# Patient Record
Sex: MALE | Race: BLACK OR AFRICAN AMERICAN | Employment: FULL TIME | URBAN - METROPOLITAN AREA
[De-identification: names, ages, dates, MRNs, and addresses within clinical notes are randomized per-mention and may not be internally consistent; named-entity substitution may affect disease eponyms.]

---

## 2019-01-29 ENCOUNTER — HOSPITAL ENCOUNTER (EMERGENCY)
Age: 24
Discharge: HOME | End: 2019-01-30
Attending: EMERGENCY MEDICINE
Payer: COMMERCIAL

## 2019-01-29 DIAGNOSIS — T78.40XA ALLERGIC REACTION, INITIAL ENCOUNTER: Primary | ICD-10-CM

## 2019-01-29 PROCEDURE — 2500000003 HC RX 250 WO HCPCS: Performed by: PHYSICIAN ASSISTANT

## 2019-01-29 PROCEDURE — 6360000002 HC RX W HCPCS: Performed by: PHYSICIAN ASSISTANT

## 2019-01-29 PROCEDURE — 96375 TX/PRO/DX INJ NEW DRUG ADDON: CPT

## 2019-01-29 PROCEDURE — 96374 THER/PROPH/DIAG INJ IV PUSH: CPT

## 2019-01-29 PROCEDURE — 99282 EMERGENCY DEPT VISIT SF MDM: CPT

## 2019-01-29 RX ORDER — METHYLPREDNISOLONE SODIUM SUCCINATE 125 MG/2ML
125 INJECTION, POWDER, LYOPHILIZED, FOR SOLUTION INTRAMUSCULAR; INTRAVENOUS ONCE
Status: COMPLETED | OUTPATIENT
Start: 2019-01-30 | End: 2019-01-29

## 2019-01-29 RX ORDER — DIPHENHYDRAMINE HYDROCHLORIDE 50 MG/ML
50 INJECTION INTRAMUSCULAR; INTRAVENOUS ONCE
Status: COMPLETED | OUTPATIENT
Start: 2019-01-30 | End: 2019-01-29

## 2019-01-29 RX ADMIN — METHYLPREDNISOLONE SODIUM SUCCINATE 125 MG: 125 INJECTION, POWDER, FOR SOLUTION INTRAMUSCULAR; INTRAVENOUS at 23:55

## 2019-01-29 RX ADMIN — DIPHENHYDRAMINE HYDROCHLORIDE 50 MG: 50 INJECTION, SOLUTION INTRAMUSCULAR; INTRAVENOUS at 23:55

## 2019-01-29 RX ADMIN — FAMOTIDINE 20 MG: 10 INJECTION, SOLUTION INTRAVENOUS at 23:55

## 2019-01-30 VITALS
BODY MASS INDEX: 28.93 KG/M2 | OXYGEN SATURATION: 97 % | SYSTOLIC BLOOD PRESSURE: 105 MMHG | WEIGHT: 180 LBS | TEMPERATURE: 97.3 F | HEART RATE: 74 BPM | RESPIRATION RATE: 16 BRPM | DIASTOLIC BLOOD PRESSURE: 70 MMHG | HEIGHT: 66 IN

## 2019-01-30 RX ORDER — FAMOTIDINE 20 MG/1
20 TABLET, FILM COATED ORAL 2 TIMES DAILY
Qty: 60 TABLET | Refills: 0 | Status: SHIPPED | OUTPATIENT
Start: 2019-01-30 | End: 2019-02-15

## 2019-01-30 RX ORDER — DIPHENHYDRAMINE HCL 25 MG
25 CAPSULE ORAL EVERY 4 HOURS PRN
Qty: 20 CAPSULE | Refills: 0 | Status: SHIPPED | OUTPATIENT
Start: 2019-01-30 | End: 2019-02-09

## 2019-01-30 RX ORDER — EPINEPHRINE 0.3 MG/.3ML
INJECTION SUBCUTANEOUS
Qty: 1 EACH | Refills: 0 | Status: SHIPPED | OUTPATIENT
Start: 2019-01-30

## 2019-01-30 RX ORDER — PREDNISONE 10 MG/1
60 TABLET ORAL DAILY
Qty: 30 TABLET | Refills: 0 | Status: SHIPPED | OUTPATIENT
Start: 2019-01-30 | End: 2019-02-04

## 2019-01-30 ASSESSMENT — ENCOUNTER SYMPTOMS
DIARRHEA: 0
VOMITING: 0
ABDOMINAL PAIN: 0
NAUSEA: 0
COUGH: 0
RHINORRHEA: 0
TROUBLE SWALLOWING: 0
SHORTNESS OF BREATH: 0

## 2019-02-15 ENCOUNTER — OFFICE VISIT (OUTPATIENT)
Dept: INTERNAL MEDICINE CLINIC | Age: 24
End: 2019-02-15
Payer: COMMERCIAL

## 2019-02-15 VITALS
WEIGHT: 153.4 LBS | DIASTOLIC BLOOD PRESSURE: 62 MMHG | HEIGHT: 67 IN | BODY MASS INDEX: 24.08 KG/M2 | HEART RATE: 75 BPM | SYSTOLIC BLOOD PRESSURE: 102 MMHG | OXYGEN SATURATION: 93 %

## 2019-02-15 DIAGNOSIS — G89.29 CHRONIC MIDLINE LOW BACK PAIN WITHOUT SCIATICA: ICD-10-CM

## 2019-02-15 DIAGNOSIS — B00.9 HERPES: ICD-10-CM

## 2019-02-15 DIAGNOSIS — S23.9XXA THORACIC BACK SPRAIN, INITIAL ENCOUNTER: Primary | ICD-10-CM

## 2019-02-15 DIAGNOSIS — Z00.00 HEALTHCARE MAINTENANCE: ICD-10-CM

## 2019-02-15 DIAGNOSIS — M54.50 CHRONIC MIDLINE LOW BACK PAIN WITHOUT SCIATICA: ICD-10-CM

## 2019-02-15 DIAGNOSIS — T78.40XA ALLERGIC REACTION, INITIAL ENCOUNTER: ICD-10-CM

## 2019-02-15 DIAGNOSIS — G43.909 MIGRAINE WITHOUT STATUS MIGRAINOSUS, NOT INTRACTABLE, UNSPECIFIED MIGRAINE TYPE: ICD-10-CM

## 2019-02-15 LAB
A/G RATIO: 1.6 (ref 1.1–2.2)
ALBUMIN SERPL-MCNC: 4.3 G/DL (ref 3.4–5)
ALP BLD-CCNC: 59 U/L (ref 40–129)
ALT SERPL-CCNC: 13 U/L (ref 10–40)
ANION GAP SERPL CALCULATED.3IONS-SCNC: 12 MMOL/L (ref 3–16)
AST SERPL-CCNC: 18 U/L (ref 15–37)
BASOPHILS ABSOLUTE: 0 K/UL (ref 0–0.2)
BASOPHILS RELATIVE PERCENT: 0.6 %
BILIRUB SERPL-MCNC: 0.4 MG/DL (ref 0–1)
BUN BLDV-MCNC: 8 MG/DL (ref 7–20)
CALCIUM SERPL-MCNC: 9.8 MG/DL (ref 8.3–10.6)
CHLORIDE BLD-SCNC: 106 MMOL/L (ref 99–110)
CO2: 24 MMOL/L (ref 21–32)
CREAT SERPL-MCNC: 0.8 MG/DL (ref 0.9–1.3)
EOSINOPHILS ABSOLUTE: 0.1 K/UL (ref 0–0.6)
EOSINOPHILS RELATIVE PERCENT: 1.6 %
GFR AFRICAN AMERICAN: >60
GFR NON-AFRICAN AMERICAN: >60
GLOBULIN: 2.7 G/DL
GLUCOSE BLD-MCNC: 96 MG/DL (ref 70–99)
HCT VFR BLD CALC: 45.8 % (ref 40.5–52.5)
HEMOGLOBIN: 14.8 G/DL (ref 13.5–17.5)
LYMPHOCYTES ABSOLUTE: 2 K/UL (ref 1–5.1)
LYMPHOCYTES RELATIVE PERCENT: 45.1 %
MCH RBC QN AUTO: 29.3 PG (ref 26–34)
MCHC RBC AUTO-ENTMCNC: 32.2 G/DL (ref 31–36)
MCV RBC AUTO: 90.8 FL (ref 80–100)
MONOCYTES ABSOLUTE: 0.4 K/UL (ref 0–1.3)
MONOCYTES RELATIVE PERCENT: 8 %
NEUTROPHILS ABSOLUTE: 2 K/UL (ref 1.7–7.7)
NEUTROPHILS RELATIVE PERCENT: 44.7 %
PDW BLD-RTO: 14.9 % (ref 12.4–15.4)
PLATELET # BLD: 296 K/UL (ref 135–450)
PMV BLD AUTO: 8.1 FL (ref 5–10.5)
POTASSIUM SERPL-SCNC: 4.6 MMOL/L (ref 3.5–5.1)
RBC # BLD: 5.04 M/UL (ref 4.2–5.9)
SODIUM BLD-SCNC: 142 MMOL/L (ref 136–145)
TOTAL PROTEIN: 7 G/DL (ref 6.4–8.2)
VITAMIN D 25-HYDROXY: 27.5 NG/ML
WBC # BLD: 4.4 K/UL (ref 4–11)

## 2019-02-15 PROCEDURE — 36415 COLL VENOUS BLD VENIPUNCTURE: CPT | Performed by: NURSE PRACTITIONER

## 2019-02-15 PROCEDURE — 1036F TOBACCO NON-USER: CPT | Performed by: NURSE PRACTITIONER

## 2019-02-15 PROCEDURE — G8420 CALC BMI NORM PARAMETERS: HCPCS | Performed by: NURSE PRACTITIONER

## 2019-02-15 PROCEDURE — G8484 FLU IMMUNIZE NO ADMIN: HCPCS | Performed by: NURSE PRACTITIONER

## 2019-02-15 PROCEDURE — G8427 DOCREV CUR MEDS BY ELIG CLIN: HCPCS | Performed by: NURSE PRACTITIONER

## 2019-02-15 PROCEDURE — 99203 OFFICE O/P NEW LOW 30 MIN: CPT | Performed by: NURSE PRACTITIONER

## 2019-02-15 RX ORDER — VALACYCLOVIR HYDROCHLORIDE 500 MG/1
500 TABLET, FILM COATED ORAL 2 TIMES DAILY
COMMUNITY
End: 2019-02-15 | Stop reason: SDUPTHER

## 2019-02-15 RX ORDER — VALACYCLOVIR HYDROCHLORIDE 500 MG/1
500 TABLET, FILM COATED ORAL 2 TIMES DAILY
Qty: 60 TABLET | Refills: 3 | Status: SHIPPED | OUTPATIENT
Start: 2019-02-15 | End: 2020-07-23

## 2019-02-15 ASSESSMENT — PATIENT HEALTH QUESTIONNAIRE - PHQ9
SUM OF ALL RESPONSES TO PHQ QUESTIONS 1-9: 0
2. FEELING DOWN, DEPRESSED OR HOPELESS: 0
SUM OF ALL RESPONSES TO PHQ QUESTIONS 1-9: 0
SUM OF ALL RESPONSES TO PHQ9 QUESTIONS 1 & 2: 0
1. LITTLE INTEREST OR PLEASURE IN DOING THINGS: 0

## 2019-02-15 ASSESSMENT — ENCOUNTER SYMPTOMS
COUGH: 0
CHEST TIGHTNESS: 0
STRIDOR: 0
VISUAL CHANGE: 0
ABDOMINAL DISTENTION: 0
BOWEL INCONTINENCE: 0
BACK PAIN: 1

## 2019-02-16 LAB
HIV AG/AB: NORMAL
HIV ANTIGEN: NORMAL
HIV-1 ANTIBODY: NORMAL
HIV-2 AB: NORMAL

## 2019-02-19 LAB
2000687N OAK TREE IGE: <0.1 KU/L
ALLERGEN ASPERGILLUS ALTERNATA IGE: <0.1 KU/L
ALLERGEN ASPERGILLUS FUMIGATUS IGE: <0.1 KU/L
ALLERGEN BERMUDA GRASS IGE: 11.9 KU/L
ALLERGEN BIRCH IGE: 0.27 KU/L
ALLERGEN CAT DANDER IGE: 1.16 KU/L
ALLERGEN COMMON SHORT RAGWEED IGE: 17.3 KU/L
ALLERGEN COTTONWOOD: 0.15 KU/L
ALLERGEN COW MILK IGE: 0.79 KU/L
ALLERGEN DOG DANDER IGE: 0.46 KU/L
ALLERGEN ELM IGE: 0.32 KU/L
ALLERGEN FUNGI/MOLD M.RACEMOSUS IGE: <0.1 KU/L
ALLERGEN GERMAN COCKROACH IGE: 2.11 KU/L
ALLERGEN HORMODENDRUM HORDEI IGE: <0.1 KU/L
ALLERGEN MAPLE/BOX ELDER IGE: 5.52 KU/L
ALLERGEN MITE DUST FARINAE IGE: 2.46 KU/L
ALLERGEN MITE DUST PTERONYSSINUS IGE: 2.43 KU/L
ALLERGEN MOUNTAIN CEDAR: 0.3 KU/L
ALLERGEN MOUSE EPITHELIA IGE: <0.1 KU/L
ALLERGEN PEANUT (F13) IGE: <0.1 KU/L
ALLERGEN PECAN TREE IGE: 4.78 KU/L
ALLERGEN PENICILLIUM NOTATUM: <0.1 KU/L
ALLERGEN ROUGH PIGWEED (W14) IGE: <0.1 KU/L
ALLERGEN RUSSIAN THISTLE IGE: 0.2 KU/L
ALLERGEN SEE NOTE: NORMAL
ALLERGEN SHEEP SORREL (W18) IGE: 0.2 KU/L
ALLERGEN TIMOTHY GRASS: 6.61 KU/L
ALLERGEN TREE SYCAMORE: <0.1 KU/L
ALLERGEN WALNUT TREE IGE: 0.69 KU/L
ALLERGEN WHITE MULBERRY TREE, IGE: <0.1 KU/L
ALLERGEN, TREE, WHITE ASH IGE: 3.14 KU/L
IGE: 317 KU/L

## 2019-03-10 ENCOUNTER — HOSPITAL ENCOUNTER (EMERGENCY)
Age: 24
Discharge: HOME | End: 2019-03-10

## 2019-03-10 VITALS
BODY MASS INDEX: 24.29 KG/M2 | RESPIRATION RATE: 16 BRPM | OXYGEN SATURATION: 98 % | TEMPERATURE: 97.7 F | HEART RATE: 75 BPM | DIASTOLIC BLOOD PRESSURE: 69 MMHG | SYSTOLIC BLOOD PRESSURE: 114 MMHG | WEIGHT: 156 LBS

## 2019-03-10 DIAGNOSIS — R30.0 DYSURIA: Primary | ICD-10-CM

## 2019-03-10 DIAGNOSIS — Z20.2 POSSIBLE EXPOSURE TO STD: ICD-10-CM

## 2019-03-10 LAB
BILIRUBIN URINE: NEGATIVE
BLOOD, URINE: NEGATIVE
CLARITY: ABNORMAL
COLOR: YELLOW
EPITHELIAL CELLS, UA: 0 /HPF (ref 0–5)
GLUCOSE URINE: NEGATIVE MG/DL
HYALINE CASTS: 1 /LPF (ref 0–8)
KETONES, URINE: ABNORMAL MG/DL
LEUKOCYTE ESTERASE, URINE: ABNORMAL
MICROSCOPIC EXAMINATION: YES
NITRITE, URINE: NEGATIVE
PH UA: 7.5 (ref 5–8)
PROTEIN UA: ABNORMAL MG/DL
RBC UA: 3 /HPF (ref 0–4)
SPECIFIC GRAVITY UA: 1.03 (ref 1–1.03)
URINE REFLEX TO CULTURE: YES
URINE TYPE: ABNORMAL
UROBILINOGEN, URINE: 1 E.U./DL
WBC UA: 207 /HPF (ref 0–5)

## 2019-03-10 PROCEDURE — 81001 URINALYSIS AUTO W/SCOPE: CPT

## 2019-03-10 PROCEDURE — 6370000000 HC RX 637 (ALT 250 FOR IP): Performed by: PHYSICIAN ASSISTANT

## 2019-03-10 PROCEDURE — 87491 CHLMYD TRACH DNA AMP PROBE: CPT

## 2019-03-10 PROCEDURE — 87086 URINE CULTURE/COLONY COUNT: CPT

## 2019-03-10 PROCEDURE — 96372 THER/PROPH/DIAG INJ SC/IM: CPT

## 2019-03-10 PROCEDURE — 99283 EMERGENCY DEPT VISIT LOW MDM: CPT

## 2019-03-10 PROCEDURE — 6360000002 HC RX W HCPCS: Performed by: PHYSICIAN ASSISTANT

## 2019-03-10 PROCEDURE — 87591 N.GONORRHOEAE DNA AMP PROB: CPT

## 2019-03-10 RX ORDER — NAPROXEN 500 MG/1
500 TABLET ORAL 2 TIMES DAILY WITH MEALS
COMMUNITY
End: 2020-04-10

## 2019-03-10 RX ORDER — AZITHROMYCIN 250 MG/1
1000 TABLET, FILM COATED ORAL ONCE
Status: COMPLETED | OUTPATIENT
Start: 2019-03-10 | End: 2019-03-10

## 2019-03-10 RX ORDER — CEFTRIAXONE SODIUM 250 MG/1
250 INJECTION, POWDER, FOR SOLUTION INTRAMUSCULAR; INTRAVENOUS ONCE
Status: COMPLETED | OUTPATIENT
Start: 2019-03-10 | End: 2019-03-10

## 2019-03-10 RX ADMIN — AZITHROMYCIN 1000 MG: 250 TABLET, FILM COATED ORAL at 12:50

## 2019-03-10 RX ADMIN — CEFTRIAXONE SODIUM 250 MG: 250 INJECTION, POWDER, FOR SOLUTION INTRAMUSCULAR; INTRAVENOUS at 12:50

## 2019-03-10 ASSESSMENT — ENCOUNTER SYMPTOMS
SHORTNESS OF BREATH: 0
ABDOMINAL PAIN: 0
CONSTIPATION: 0
VOMITING: 0
NAUSEA: 0
DIARRHEA: 0
RESPIRATORY NEGATIVE: 1
COLOR CHANGE: 0
COUGH: 0
BACK PAIN: 0

## 2019-03-11 LAB
C. TRACHOMATIS DNA ,URINE: POSITIVE
N. GONORRHOEAE DNA, URINE: POSITIVE

## 2019-03-12 LAB — URINE CULTURE, ROUTINE: NORMAL

## 2020-04-10 ENCOUNTER — APPOINTMENT (OUTPATIENT)
Dept: GENERAL RADIOLOGY | Age: 25
End: 2020-04-10
Payer: COMMERCIAL

## 2020-04-10 ENCOUNTER — HOSPITAL ENCOUNTER (EMERGENCY)
Age: 25
Discharge: HOME | End: 2020-04-10
Attending: EMERGENCY MEDICINE
Payer: COMMERCIAL

## 2020-04-10 VITALS
OXYGEN SATURATION: 99 % | HEART RATE: 102 BPM | WEIGHT: 200 LBS | RESPIRATION RATE: 15 BRPM | DIASTOLIC BLOOD PRESSURE: 61 MMHG | HEIGHT: 67 IN | BODY MASS INDEX: 31.39 KG/M2 | TEMPERATURE: 99.4 F | SYSTOLIC BLOOD PRESSURE: 115 MMHG

## 2020-04-10 LAB
A/G RATIO: 1.1 (ref 1.1–2.2)
ALBUMIN SERPL-MCNC: 4.3 G/DL (ref 3.4–5)
ALP BLD-CCNC: 44 U/L (ref 40–129)
ALT SERPL-CCNC: 17 U/L (ref 10–40)
ANION GAP SERPL CALCULATED.3IONS-SCNC: 10 MMOL/L (ref 3–16)
AST SERPL-CCNC: 20 U/L (ref 15–37)
BASOPHILS ABSOLUTE: 0.1 K/UL (ref 0–0.2)
BASOPHILS RELATIVE PERCENT: 0.3 %
BILIRUB SERPL-MCNC: 0.5 MG/DL (ref 0–1)
BILIRUBIN URINE: NEGATIVE
BLOOD, URINE: NEGATIVE
BUN BLDV-MCNC: 8 MG/DL (ref 7–20)
CALCIUM SERPL-MCNC: 9.3 MG/DL (ref 8.3–10.6)
CHLORIDE BLD-SCNC: 99 MMOL/L (ref 99–110)
CLARITY: CLEAR
CO2: 24 MMOL/L (ref 21–32)
COLOR: YELLOW
CREAT SERPL-MCNC: 1.2 MG/DL (ref 0.9–1.3)
EOSINOPHILS ABSOLUTE: 0 K/UL (ref 0–0.6)
EOSINOPHILS RELATIVE PERCENT: 0 %
GFR AFRICAN AMERICAN: >60
GFR NON-AFRICAN AMERICAN: >60
GLOBULIN: 3.9 G/DL
GLUCOSE BLD-MCNC: 118 MG/DL (ref 70–99)
GLUCOSE URINE: NEGATIVE MG/DL
HCT VFR BLD CALC: 46.9 % (ref 40.5–52.5)
HEMOGLOBIN: 15.8 G/DL (ref 13.5–17.5)
KETONES, URINE: NEGATIVE MG/DL
LEUKOCYTE ESTERASE, URINE: NEGATIVE
LYMPHOCYTES ABSOLUTE: 1.2 K/UL (ref 1–5.1)
LYMPHOCYTES RELATIVE PERCENT: 6 %
MCH RBC QN AUTO: 29.3 PG (ref 26–34)
MCHC RBC AUTO-ENTMCNC: 33.7 G/DL (ref 31–36)
MCV RBC AUTO: 87 FL (ref 80–100)
MICROSCOPIC EXAMINATION: NORMAL
MONOCYTES ABSOLUTE: 1.9 K/UL (ref 0–1.3)
MONOCYTES RELATIVE PERCENT: 9.3 %
NEUTROPHILS ABSOLUTE: 16.8 K/UL (ref 1.7–7.7)
NEUTROPHILS RELATIVE PERCENT: 84.4 %
NITRITE, URINE: NEGATIVE
PDW BLD-RTO: 14 % (ref 12.4–15.4)
PH UA: 7 (ref 5–8)
PLATELET # BLD: 263 K/UL (ref 135–450)
PMV BLD AUTO: 7.5 FL (ref 5–10.5)
POTASSIUM SERPL-SCNC: 4.1 MMOL/L (ref 3.5–5.1)
PROTEIN UA: NEGATIVE MG/DL
RAPID INFLUENZA  B AGN: NEGATIVE
RAPID INFLUENZA A AGN: NEGATIVE
RBC # BLD: 5.39 M/UL (ref 4.2–5.9)
SODIUM BLD-SCNC: 133 MMOL/L (ref 136–145)
SPECIFIC GRAVITY UA: 1.01 (ref 1–1.03)
TOTAL CK: 698 U/L (ref 39–308)
TOTAL PROTEIN: 8.2 G/DL (ref 6.4–8.2)
URINE REFLEX TO CULTURE: NORMAL
URINE TYPE: NORMAL
UROBILINOGEN, URINE: 1 E.U./DL
WBC # BLD: 20 K/UL (ref 4–11)

## 2020-04-10 PROCEDURE — 6360000002 HC RX W HCPCS: Performed by: EMERGENCY MEDICINE

## 2020-04-10 PROCEDURE — 87804 INFLUENZA ASSAY W/OPTIC: CPT

## 2020-04-10 PROCEDURE — 82550 ASSAY OF CK (CPK): CPT

## 2020-04-10 PROCEDURE — 93005 ELECTROCARDIOGRAM TRACING: CPT | Performed by: EMERGENCY MEDICINE

## 2020-04-10 PROCEDURE — 80053 COMPREHEN METABOLIC PANEL: CPT

## 2020-04-10 PROCEDURE — 99285 EMERGENCY DEPT VISIT HI MDM: CPT

## 2020-04-10 PROCEDURE — 81003 URINALYSIS AUTO W/O SCOPE: CPT

## 2020-04-10 PROCEDURE — 85025 COMPLETE CBC W/AUTO DIFF WBC: CPT

## 2020-04-10 PROCEDURE — 6370000000 HC RX 637 (ALT 250 FOR IP): Performed by: PHYSICIAN ASSISTANT

## 2020-04-10 PROCEDURE — 6370000000 HC RX 637 (ALT 250 FOR IP): Performed by: EMERGENCY MEDICINE

## 2020-04-10 PROCEDURE — 71045 X-RAY EXAM CHEST 1 VIEW: CPT

## 2020-04-10 PROCEDURE — 96374 THER/PROPH/DIAG INJ IV PUSH: CPT

## 2020-04-10 PROCEDURE — 2580000003 HC RX 258: Performed by: PHYSICIAN ASSISTANT

## 2020-04-10 RX ORDER — ACETAMINOPHEN 500 MG
1000 TABLET ORAL EVERY 6 HOURS PRN
Qty: 30 TABLET | Refills: 0 | Status: SHIPPED | OUTPATIENT
Start: 2020-04-10 | End: 2020-07-23

## 2020-04-10 RX ORDER — METHYLPREDNISOLONE 4 MG/1
TABLET ORAL
Qty: 1 KIT | Refills: 0 | Status: SHIPPED | OUTPATIENT
Start: 2020-04-10 | End: 2020-04-16

## 2020-04-10 RX ORDER — DEXAMETHASONE SODIUM PHOSPHATE 10 MG/ML
10 INJECTION, SOLUTION INTRAMUSCULAR; INTRAVENOUS ONCE
Status: COMPLETED | OUTPATIENT
Start: 2020-04-10 | End: 2020-04-10

## 2020-04-10 RX ORDER — ACETAMINOPHEN 500 MG
1000 TABLET ORAL ONCE
Status: COMPLETED | OUTPATIENT
Start: 2020-04-10 | End: 2020-04-10

## 2020-04-10 RX ORDER — AMOXICILLIN 875 MG/1
875 TABLET, COATED ORAL 2 TIMES DAILY
Qty: 20 TABLET | Refills: 0 | Status: SHIPPED | OUTPATIENT
Start: 2020-04-10 | End: 2020-04-20

## 2020-04-10 RX ORDER — 0.9 % SODIUM CHLORIDE 0.9 %
1000 INTRAVENOUS SOLUTION INTRAVENOUS ONCE
Status: COMPLETED | OUTPATIENT
Start: 2020-04-10 | End: 2020-04-10

## 2020-04-10 RX ORDER — AMOXICILLIN 250 MG/1
1000 CAPSULE ORAL EVERY 8 HOURS SCHEDULED
Status: DISCONTINUED | OUTPATIENT
Start: 2020-04-10 | End: 2020-04-10 | Stop reason: HOSPADM

## 2020-04-10 RX ADMIN — ACETAMINOPHEN 1000 MG: 500 TABLET, FILM COATED ORAL at 12:05

## 2020-04-10 RX ADMIN — SODIUM CHLORIDE 1000 ML: 9 INJECTION, SOLUTION INTRAVENOUS at 13:02

## 2020-04-10 RX ADMIN — SODIUM CHLORIDE 1000 ML: 9 INJECTION, SOLUTION INTRAVENOUS at 11:47

## 2020-04-10 RX ADMIN — AMOXICILLIN 1000 MG: 250 CAPSULE ORAL at 14:30

## 2020-04-10 RX ADMIN — DEXAMETHASONE SODIUM PHOSPHATE 10 MG: 10 INJECTION, SOLUTION INTRAMUSCULAR; INTRAVENOUS at 14:30

## 2020-04-10 ASSESSMENT — PAIN DESCRIPTION - PAIN TYPE: TYPE: ACUTE PAIN

## 2020-04-10 ASSESSMENT — PAIN DESCRIPTION - LOCATION: LOCATION: BACK

## 2020-04-10 ASSESSMENT — PAIN SCALES - GENERAL: PAINLEVEL_OUTOF10: 10

## 2020-04-10 NOTE — ED NOTES
Pt discharged in stable condition, VSS, no signs of distress. Discharge instructions and meds reviewed. Pt verbalizes understanding and states no further questions or concerns unaddressed.        Abiola Harden RN  04/10/20 0021

## 2020-04-10 NOTE — ED PROVIDER NOTES
[facility]        Pt Name: Brian Teague  MRN: [id number]  [place] 1995  Date of evaluation: 4/10/2020  Provider: Alberto Recinos PA-C  PCP: MIKE Oconnor CNP     I have seen and evaluated this patient with my supervising physician Dominga Diggs       Chief Complaint   Patient presents with    Back Pain     Lower back down since yesterday at work. Denies injury. Denies SOB, cough, N/V/D, +sore throat, +weakness, +generalized body aches. Fever 102.3F now       HISTORY OF PRESENT ILLNESS   (Location, Timing/Onset, Context/Setting, Quality, Duration, Modifying Factors, Severity, Associated Signs and Symptoms)  Note limiting factors. Brian Teague is a 30 y.o. male that presents to the emergency department with a chief complaint shortness of breath, general fatigue, body aches and fever that began yesterday. He works at [employer]. Denies any known sick contact or recent travel. Denies any history of asthma, lung disease, vaping or smoking. Denies cough, shortness of breath, dysuria, hematuria, diarrhea, bloody stool, history of diabetes, kidney disease, recent injection or surgery IV drug use. Despite denying a cough he states the only thing that he is taken today is over-the-counter cough medicine. Rates the symptoms a 10 out of 10. Denies any other symptoms. Nursing Notes were all reviewed and agreed with or any disagreements were addressed in the HPI. REVIEW OF SYSTEMS    (2-9 systems for level 4, 10 or more for level 5)     Review of Systems    Positives and Pertinent negatives as per HPI. Except as noted above in the ROS, all other systems were reviewed and negative. PAST MEDICAL HISTORY     Past Medical History:   Diagnosis Date    Herpes 02/2015    Low back pain 6/4/2015    Migraines     Neck pain          SURGICAL HISTORY   History reviewed.  No pertinent surgical history. [id number]       Discharge Medication List as of 4/10/2020  2:33 PM      CONTINUE these medications which have NOT CHANGED    Details   valACYclovir (VALTREX) 500 MG tablet Take 1 tablet by mouth 2 times daily, Disp-60 tablet, R-3Normal      EPINEPHrine (EPIPEN 2-MANNIE) 0.3 MG/0.3ML SOAJ injection Use as directed for allergic reaction, Disp-1 each, R-0Print      metoclopramide (REGLAN) 10 MG tablet Take 1 tablet by mouth 3 times daily as needed (headache), Disp-20 tablet, R-0Print      diphenhydrAMINE (BENADRYL) 25 MG capsule Take 1 capsule by mouth every 8 hours as needed for Itching, Disp-20 capsule, R-0Print      methocarbamol (ROBAXIN) 500 MG tablet Take 2 tablets by mouth nightly as needed (pain), Disp-30 tablet, R-0      traMADol (ULTRAM) 50 MG tablet Take 50 mg by mouth every 6 hours as needed for Pain      ibuprofen (ADVIL;MOTRIN) 400 MG tablet Take 1 tablet by mouth every 8 hours as needed for Pain or Fever, Disp-60 tablet, R-1               ALLERGIES     Lactose    FAMILYHISTORY       Family History   Problem Relation Age of Onset    No Known Problems Mother     No Known Problems Father           SOCIAL HISTORY       Social History     Tobacco Use    Smoking status: Never Smoker    Smokeless tobacco: Never Used   Substance Use Topics    Alcohol use: No    Drug use: No       SCREENINGS             PHYSICAL EXAM    (up to 7 for level 4, 8 or more for level 5)     ED Triage Vitals [04/10/20 1118]   BP Temp Temp Source Pulse Resp SpO2 Height Weight   121/77 102.3 °F (39.1 °C) Oral 141 18 95 % 5' 7\" (1.702 m) 200 lb (90.7 kg)       Physical Exam  Vitals signs and nursing note reviewed. Constitutional:       Appearance: He is well-developed. He is not diaphoretic. HENT:      Head: Atraumatic. Nose: Nose normal.      Mouth/Throat:      Mouth: Mucous membranes are moist.      Pharynx: No oropharyngeal exudate or posterior oropharyngeal erythema.    Eyes:      General: instructions on prevention of transmission of infection to others. The patient is low risk for mortality based on demographic, history and clinical factors. Specific COVID19 testing is not available or not approved in this patient. Given the best available information and clinical assessment, I estimate the risk of hospitalization to be greater than the risk of treatment at home. I have explained to the patient that the risk could rapidly change, given precautions for return and instructions on how to minimize being contagious. FINAL IMPRESSION      1. Acute tonsillitis, unspecified etiology    2.  Acute bilateral low back pain without sciatica          DISPOSITION/PLAN   DISPOSITION Decision To Discharge 04/10/2020 02:07:00 PM      PATIENT REFERREDTO:  Renetta Park, APRN - CNP  [address] [address]  [phone]    Schedule an appointment as soon as possible for a visit       [facility] Emergency Department  [address] [place] [address]  [phone]    If symptoms worsen      DISCHARGE MEDICATIONS:  Discharge Medication List as of 4/10/2020  2:33 PM      START taking these medications    Details   amoxicillin (AMOXIL) 875 MG tablet Take 1 tablet by mouth 2 times daily for 10 days, Disp-20 tablet, R-0Print      methylPREDNISolone (MEDROL, MANNIE,) 4 MG tablet Take by mouth., Disp-1 kit, R-0Print      acetaminophen (TYLENOL) 500 MG tablet Take 2 tablets by mouth every 6 hours as needed for Pain, Disp-30 tablet, R-0Print             DISCONTINUED MEDICATIONS:  Discharge Medication List as of 4/10/2020  2:33 PM      STOP taking these medications       naproxen (NAPROSYN) 500 MG tablet Comments:   Reason for Stopping:                      (Please note that portions of this note were completed with a voice recognition program.  Efforts were made to edit the dictations but occasionally words are mis-transcribed.)    Yolanda Beth PA-C (electronically signed)            All Paniagua PA-C  04/10/20 1715

## 2020-04-11 LAB
EKG ATRIAL RATE: 125 BPM
EKG DIAGNOSIS: NORMAL
EKG P AXIS: 46 DEGREES
EKG P-R INTERVAL: 142 MS
EKG Q-T INTERVAL: 286 MS
EKG QRS DURATION: 88 MS
EKG QTC CALCULATION (BAZETT): 412 MS
EKG R AXIS: 50 DEGREES
EKG T AXIS: 34 DEGREES
EKG VENTRICULAR RATE: 125 BPM

## 2020-04-11 PROCEDURE — 93010 ELECTROCARDIOGRAM REPORT: CPT | Performed by: INTERNAL MEDICINE

## 2020-04-13 ENCOUNTER — HOSPITAL ENCOUNTER (EMERGENCY)
Age: 25
Discharge: HOME | End: 2020-04-13
Attending: EMERGENCY MEDICINE
Payer: COMMERCIAL

## 2020-04-13 ENCOUNTER — APPOINTMENT (OUTPATIENT)
Dept: GENERAL RADIOLOGY | Age: 25
End: 2020-04-13
Payer: COMMERCIAL

## 2020-04-13 VITALS
RESPIRATION RATE: 10 BRPM | WEIGHT: 200 LBS | SYSTOLIC BLOOD PRESSURE: 100 MMHG | BODY MASS INDEX: 31.39 KG/M2 | DIASTOLIC BLOOD PRESSURE: 66 MMHG | OXYGEN SATURATION: 98 % | HEIGHT: 67 IN | HEART RATE: 78 BPM | TEMPERATURE: 98.5 F

## 2020-04-13 LAB
A/G RATIO: 1 (ref 1.1–2.2)
ALBUMIN SERPL-MCNC: 3.5 G/DL (ref 3.4–5)
ALP BLD-CCNC: 38 U/L (ref 40–129)
ALT SERPL-CCNC: 17 U/L (ref 10–40)
ANION GAP SERPL CALCULATED.3IONS-SCNC: 11 MMOL/L (ref 3–16)
AST SERPL-CCNC: 23 U/L (ref 15–37)
BASOPHILS ABSOLUTE: 0.1 K/UL (ref 0–0.2)
BASOPHILS RELATIVE PERCENT: 1 %
BILIRUB SERPL-MCNC: 0.3 MG/DL (ref 0–1)
BUN BLDV-MCNC: 12 MG/DL (ref 7–20)
CALCIUM SERPL-MCNC: 8.6 MG/DL (ref 8.3–10.6)
CHLORIDE BLD-SCNC: 101 MMOL/L (ref 99–110)
CO2: 24 MMOL/L (ref 21–32)
CREAT SERPL-MCNC: 1.1 MG/DL (ref 0.9–1.3)
D DIMER: <200 NG/ML DDU (ref 0–229)
EKG ATRIAL RATE: 89 BPM
EKG DIAGNOSIS: NORMAL
EKG P AXIS: 51 DEGREES
EKG P-R INTERVAL: 150 MS
EKG Q-T INTERVAL: 356 MS
EKG QRS DURATION: 86 MS
EKG QTC CALCULATION (BAZETT): 433 MS
EKG R AXIS: 43 DEGREES
EKG T AXIS: 36 DEGREES
EKG VENTRICULAR RATE: 89 BPM
EOSINOPHILS ABSOLUTE: 0.2 K/UL (ref 0–0.6)
EOSINOPHILS RELATIVE PERCENT: 2.7 %
GFR AFRICAN AMERICAN: >60
GFR NON-AFRICAN AMERICAN: >60
GLOBULIN: 3.6 G/DL
GLUCOSE BLD-MCNC: 108 MG/DL (ref 70–99)
HCT VFR BLD CALC: 41.9 % (ref 40.5–52.5)
HEMOGLOBIN: 14.2 G/DL (ref 13.5–17.5)
LYMPHOCYTES ABSOLUTE: 2.2 K/UL (ref 1–5.1)
LYMPHOCYTES RELATIVE PERCENT: 32.6 %
MCH RBC QN AUTO: 29.5 PG (ref 26–34)
MCHC RBC AUTO-ENTMCNC: 33.9 G/DL (ref 31–36)
MCV RBC AUTO: 87.2 FL (ref 80–100)
MONOCYTES ABSOLUTE: 1 K/UL (ref 0–1.3)
MONOCYTES RELATIVE PERCENT: 15.3 %
NEUTROPHILS ABSOLUTE: 3.3 K/UL (ref 1.7–7.7)
NEUTROPHILS RELATIVE PERCENT: 48.4 %
PDW BLD-RTO: 14.3 % (ref 12.4–15.4)
PLATELET # BLD: 317 K/UL (ref 135–450)
PMV BLD AUTO: 7.3 FL (ref 5–10.5)
POTASSIUM REFLEX MAGNESIUM: 4 MMOL/L (ref 3.5–5.1)
RBC # BLD: 4.81 M/UL (ref 4.2–5.9)
SODIUM BLD-SCNC: 136 MMOL/L (ref 136–145)
TOTAL PROTEIN: 7.1 G/DL (ref 6.4–8.2)
TROPONIN: <0.01 NG/ML
WBC # BLD: 6.7 K/UL (ref 4–11)

## 2020-04-13 PROCEDURE — 85379 FIBRIN DEGRADATION QUANT: CPT

## 2020-04-13 PROCEDURE — 71045 X-RAY EXAM CHEST 1 VIEW: CPT

## 2020-04-13 PROCEDURE — 93010 ELECTROCARDIOGRAM REPORT: CPT | Performed by: INTERNAL MEDICINE

## 2020-04-13 PROCEDURE — 80053 COMPREHEN METABOLIC PANEL: CPT

## 2020-04-13 PROCEDURE — 6360000002 HC RX W HCPCS: Performed by: PHYSICIAN ASSISTANT

## 2020-04-13 PROCEDURE — 93005 ELECTROCARDIOGRAM TRACING: CPT | Performed by: EMERGENCY MEDICINE

## 2020-04-13 PROCEDURE — 85025 COMPLETE CBC W/AUTO DIFF WBC: CPT

## 2020-04-13 PROCEDURE — 96375 TX/PRO/DX INJ NEW DRUG ADDON: CPT

## 2020-04-13 PROCEDURE — 96374 THER/PROPH/DIAG INJ IV PUSH: CPT

## 2020-04-13 PROCEDURE — 84484 ASSAY OF TROPONIN QUANT: CPT

## 2020-04-13 PROCEDURE — 36415 COLL VENOUS BLD VENIPUNCTURE: CPT

## 2020-04-13 PROCEDURE — 99285 EMERGENCY DEPT VISIT HI MDM: CPT

## 2020-04-13 PROCEDURE — C9113 INJ PANTOPRAZOLE SODIUM, VIA: HCPCS | Performed by: PHYSICIAN ASSISTANT

## 2020-04-13 RX ORDER — RANITIDINE 150 MG/1
150 TABLET ORAL 2 TIMES DAILY
Qty: 60 TABLET | Refills: 0 | Status: SHIPPED | OUTPATIENT
Start: 2020-04-13 | End: 2020-06-24 | Stop reason: SINTOL

## 2020-04-13 RX ORDER — PANTOPRAZOLE SODIUM 40 MG/10ML
40 INJECTION, POWDER, LYOPHILIZED, FOR SOLUTION INTRAVENOUS ONCE
Status: COMPLETED | OUTPATIENT
Start: 2020-04-13 | End: 2020-04-13

## 2020-04-13 RX ORDER — ONDANSETRON 2 MG/ML
4 INJECTION INTRAMUSCULAR; INTRAVENOUS ONCE
Status: COMPLETED | OUTPATIENT
Start: 2020-04-13 | End: 2020-04-13

## 2020-04-13 RX ADMIN — ONDANSETRON 4 MG: 2 INJECTION INTRAMUSCULAR; INTRAVENOUS at 03:25

## 2020-04-13 RX ADMIN — PANTOPRAZOLE SODIUM 40 MG: 40 INJECTION, POWDER, FOR SOLUTION INTRAVENOUS at 03:25

## 2020-04-13 ASSESSMENT — ENCOUNTER SYMPTOMS
CHEST TIGHTNESS: 0
SORE THROAT: 1
ABDOMINAL PAIN: 0
DIARRHEA: 0
VOMITING: 0
COUGH: 0
NAUSEA: 0
SHORTNESS OF BREATH: 1

## 2020-04-13 ASSESSMENT — PAIN SCALES - GENERAL: PAINLEVEL_OUTOF10: 9

## 2020-04-13 NOTE — ED PROVIDER NOTES
Comments: Resting comfortably in the examination room. No evidence of hiccups during history taking under physical exam.   HENT:      Head: Normocephalic and atraumatic. Right Ear: Hearing and external ear normal.      Left Ear: Hearing and external ear normal.   Eyes:      General: No scleral icterus. Right eye: No discharge. Left eye: No discharge. Conjunctiva/sclera: Conjunctivae normal.   Neck:      Musculoskeletal: Normal range of motion. Vascular: No JVD. Cardiovascular:      Rate and Rhythm: Normal rate and regular rhythm. Heart sounds: No murmur. No friction rub. No gallop. Pulmonary:      Effort: Pulmonary effort is normal. No accessory muscle usage or respiratory distress. Breath sounds: Normal breath sounds. No wheezing, rhonchi or rales. Abdominal:      General: Abdomen is flat. There is no distension. Palpations: Abdomen is soft. Abdomen is not rigid. There is no mass. Tenderness: There is no abdominal tenderness. There is no guarding or rebound. Skin:     General: Skin is warm and dry. Neurological:      Mental Status: He is alert and oriented to person, place, and time. GCS: GCS eye subscore is 4. GCS verbal subscore is 5. GCS motor subscore is 6. Cranial Nerves: No cranial nerve deficit. Sensory: No sensory deficit. Coordination: Coordination normal.   Psychiatric:         Behavior: Behavior normal. Behavior is cooperative.          DIAGNOSTIC RESULTS   LABS:    Labs Reviewed   COMPREHENSIVE METABOLIC PANEL W/ REFLEX TO MG FOR LOW K - Abnormal; Notable for the following components:       Result Value    Glucose 108 (*)     Albumin/Globulin Ratio 1.0 (*)     Alkaline Phosphatase 38 (*)     All other components within normal limits    Narrative:     Performed at:  [facility] [address], [facility], [address]   Phone [phone]   CBC WITH AUTO DIFFERENTIAL    Narrative: silhouette is normal.     No acute abnormality. PROCEDURES   Unless otherwise noted below, none     Procedures    CRITICAL CARE TIME   N/A    CONSULTS:  None      EMERGENCY DEPARTMENT COURSE and DIFFERENTIAL DIAGNOSIS/MDM:   Vitals:    Vitals:    04/13/20 0120   BP: 106/70   Pulse: 91   Resp: 18   Temp: 98.5 °F (36.9 °C)   TempSrc: Oral   SpO2: 98%   Weight: 200 lb (90.7 kg)   Height: 5' 7\" (1.702 m)       Patient was given the following medications:  Medications   pantoprazole (PROTONIX) injection 40 mg (40 mg Intravenous Given 4/13/20 0325)   ondansetron (ZOFRAN) injection 4 mg (4 mg Intravenous Given 4/13/20 0325)       The patient's detailed history of present illness is documented as above. Upon arrival to the emergency department the patient's vital signs are as documented. The patient is noted to be hemodynamically stable and afebrile. Physical examination findings are as above. IV access was obtained. Laboratory testing and work-up was initiated. Initial EKG demonstrates a sinus rhythm with a rate 89. No evidence of acute ST elevation. Please see attending physician details for further EKG interpretation. Portable chest x-ray completed here in the emergency department today demonstrates no evidence of acute cardiopulmonary process. The patient's CBC is as documented above demonstrating no evidence of significant leukocytosis, anemia, thrombocytopenia and/or thrombocytosis. The patient's comprehensive metabolic panel is as above with both renal and hepatic functions preserved and with no evidence of significant electrolyte abnormalities. Patient's troponin is less than 0.01. Patient's d-dimer is negative predictive value. Patient symptoms do appear most consistent symptoms related to GERD. He is already on self-isolation. I have suggested initiation of treatment with Zantac on an outpatient basis and follow-up as previously arranged with his primary care provider.  The patient has been MEDICATIONS:  New Prescriptions    RANITIDINE (ZANTAC) 150 MG TABLET    Take 1 tablet by mouth 2 times daily       DISCONTINUED MEDICATIONS:  Discontinued Medications    No medications on file              (Please note that portions of this note were completed with a voice recognition program.  Efforts were made to edit the dictations but occasionally words are mis-transcribed.)    Cintia Prieto PA-C (electronically signed)            Jen Graham PA-C  04/13/20 6125

## 2020-04-13 NOTE — ED PROVIDER NOTES
I independently performed a history and physical on Elvin Mercado. All diagnostic, treatment, and disposition decisions were made by myself in conjunction with the advanced practice provider. Briefly, this is a 30 y.o. male here for burning chest pain and sore throat. He was diagnosed with strep throat on Friay. These symptoms started today. He rates his pain at 9/10. On exam, the patient appears well-hydrated, well-nourished, and in no acute distress. Mucous membranes are moist. Speech is clear. Breathing is unlabored. Skin is dry. Mental status is normal. The patient moves all extremities. Face is symmetric without droop. EKG  The Ekg interpreted by me in the absence of a cardiologist shows. normal sinus rhythm with a rate of 89  Axis is   Normal  QTc is  normal  Intervals and Durations are unremarkable. No specific ST-T wave changes appreciated. No evidence of acute ischemia. No significant change from prior EKG dated 4/10/2020      Patient Referrals:  Concha Velez, APRN - CNP  [address] [facility]  [phone]    Schedule an appointment as soon as possible for a visit       [facility] Emergency Department  [address] [facility]  [phone]    If symptoms worsen      Discharge Medications:  Discharge Medication List as of 4/13/2020  4:07 AM      START taking these medications    Details   raNITIdine (ZANTAC) 150 MG tablet Take 1 tablet by mouth 2 times daily, Disp-60 tablet, R-0Print             FINAL IMPRESSION  1. Chest pain, unspecified type    2. Gastroesophageal reflux disease, esophagitis presence not specified    3. Hiccups    4. Shortness of breath    5. History of streptococcal pharyngitis        Blood pressure 100/66, pulse 78, temperature 98.5 °F (36.9 °C), temperature source Oral, resp. rate 10, height 5' 7\" (1.702 m), weight 200 lb (90.7 kg), SpO2 98 %.      For further details of [facility] emergency department encounter, please see documentation by advanced practice provider, DES Buck.        Niharika Javier MD  05/01/20 1917
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
## 2020-06-24 ENCOUNTER — VIRTUAL VISIT (OUTPATIENT)
Dept: INTERNAL MEDICINE CLINIC | Age: 25
End: 2020-06-24
Payer: COMMERCIAL

## 2020-06-24 PROCEDURE — G8427 DOCREV CUR MEDS BY ELIG CLIN: HCPCS | Performed by: NURSE PRACTITIONER

## 2020-06-24 PROCEDURE — G8419 CALC BMI OUT NRM PARAM NOF/U: HCPCS | Performed by: NURSE PRACTITIONER

## 2020-06-24 PROCEDURE — 99213 OFFICE O/P EST LOW 20 MIN: CPT | Performed by: NURSE PRACTITIONER

## 2020-06-24 PROCEDURE — 1036F TOBACCO NON-USER: CPT | Performed by: NURSE PRACTITIONER

## 2020-06-24 RX ORDER — ACYCLOVIR 50 MG/G
OINTMENT TOPICAL
Refills: 1 | Status: CANCELLED | OUTPATIENT
Start: 2020-06-24 | End: 2020-07-01

## 2020-06-24 RX ORDER — ACYCLOVIR 400 MG/1
400 TABLET ORAL 3 TIMES DAILY
Qty: 30 TABLET | Refills: 3 | Status: SHIPPED | OUTPATIENT
Start: 2020-06-24 | End: 2020-07-04

## 2020-06-24 RX ORDER — FLUTICASONE PROPIONATE 50 MCG
1 SPRAY, SUSPENSION (ML) NASAL DAILY
Qty: 1 BOTTLE | Refills: 3 | Status: SHIPPED | OUTPATIENT
Start: 2020-06-24 | End: 2020-07-23

## 2020-06-24 ASSESSMENT — PATIENT HEALTH QUESTIONNAIRE - PHQ9
SUM OF ALL RESPONSES TO PHQ QUESTIONS 1-9: 0
SUM OF ALL RESPONSES TO PHQ9 QUESTIONS 1 & 2: 0
1. LITTLE INTEREST OR PLEASURE IN DOING THINGS: 0
2. FEELING DOWN, DEPRESSED OR HOPELESS: 0
SUM OF ALL RESPONSES TO PHQ QUESTIONS 1-9: 0

## 2020-06-24 ASSESSMENT — ENCOUNTER SYMPTOMS
RHINORRHEA: 1
CHEST TIGHTNESS: 1
VOICE CHANGE: 0
TROUBLE SWALLOWING: 0
BACK PAIN: 1
SHORTNESS OF BREATH: 0
ABDOMINAL PAIN: 0

## 2020-07-08 ENCOUNTER — OFFICE VISIT (OUTPATIENT)
Dept: ORTHOPEDIC SURGERY | Age: 25
End: 2020-07-08
Payer: COMMERCIAL

## 2020-07-08 VITALS — WEIGHT: 198 LBS | BODY MASS INDEX: 31.08 KG/M2 | TEMPERATURE: 97.4 F | HEIGHT: 67 IN

## 2020-07-08 PROCEDURE — G8427 DOCREV CUR MEDS BY ELIG CLIN: HCPCS | Performed by: PHYSICIAN ASSISTANT

## 2020-07-08 PROCEDURE — G8417 CALC BMI ABV UP PARAM F/U: HCPCS | Performed by: PHYSICIAN ASSISTANT

## 2020-07-08 PROCEDURE — 99243 OFF/OP CNSLTJ NEW/EST LOW 30: CPT | Performed by: PHYSICIAN ASSISTANT
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
## 2020-07-14 ENCOUNTER — HOSPITAL ENCOUNTER (OUTPATIENT)
Dept: PHYSICAL THERAPY | Age: 25
Setting detail: THERAPIES SERIES
Discharge: HOME | End: 2020-07-14
Payer: COMMERCIAL

## 2020-07-14 PROCEDURE — 97110 THERAPEUTIC EXERCISES: CPT

## 2020-07-14 PROCEDURE — 97530 THERAPEUTIC ACTIVITIES: CPT

## 2020-07-14 PROCEDURE — 97161 PT EVAL LOW COMPLEX 20 MIN: CPT

## 2020-07-14 NOTE — FLOWSHEET NOTE
[address] [facility]  Phone: [phone]   Fax: [phone]    Physical Therapy Treatment Note/ Progress Report:     Date:  2020    Patient Name:  Hector Forbes    :  1995  MRN: [id number]  Restrictions/Precautions:    Medical/Treatment Diagnosis Information:  · Diagnosis: Neck pain M50.30  · Treatment Diagnosis: increased muscle guarding in the L UT, scalenes, levator scap, and SCM; decreased joint mobility C3-C7, decreased joint mobility T1-T7, decreased cervical ROM  Insurance/Certification information:  PT Insurance Information: [facility]  Physician Information:  Referring Practitioner: Oliver Schilder, PA  Plan of care signed (Y/N): []  Yes [x]  No     Date of Patient follow up with Physician:      Progress Report: []  Yes  [x]  No     Date Range for reporting period:  Beginnin20  Ending:     Progress report due (10 Rx/or 30 days whichever is less): 10    Recertification due (POC duration/ or 90 days whichever is less): POC    Visit # Insurance Allowable Auth required?  Date Range   30 []  Yes  [x]  No N/A     Latex Allergy:  [x]NO      []YES  Preferred Language for Healthcare:   [x]English       []other:    Functional Scale:         Date assessed:  NDI: raw score = 10; dysfunction = 44%    20    Pain level:  8-9/10     SUBJECTIVE:  See eval    OBJECTIVE: See eval   Observation:    Test measurements:      RESTRICTIONS/PRECAUTIONS: No end range grade V cervical rotation manip    Exercises/Interventions:   Therapeutic Exercise (02599) Resistance / level Sets/sec Reps Notes   UBE (if tolerable)       Chink tucks       pulleys       scap squeeze       scap circles       Wall walks with overpressure                            Therapeutic Activities (24166)                                                 NMR re-education (56973)                                          Manual Intervention (08663)       Cerv mobs/manip       Thoracic mobs/manip CT manip       Rib mobilizations       STM                  Patient education:  -pt educated on diagnosis, prognosis and expectations for rehab  -all pt questions were answered    Home Exercise Program:  7/14: shoulder rolls, scap squeeze, UT stretch. HO issued. Therapeutic Exercise and NMR EXR  [] (29623) Provided verbal/tactile cueing for activities related to strengthening, flexibility, endurance, ROM  for improvements in cervical, postural, scapular, scapulothoracic and UE control with self care, reaching, carrying, lifting, house/yardwork, driving/computer work.    [] (78120) Provided verbal/tactile cueing for activities related to improving balance, coordination, kinesthetic sense, posture, motor skill, proprioception  to assist with cervical, scapular, scapulothoracic and UE control with self care, reaching, carrying, lifting, house/yardwork, driving/computer work.  [] (90858) Therapist is in constant attendance of 2 or more patients providing skilled therapy interventions, but not providing any significant amount of measurable one-on-one time to either patient, for improvements in cervical, scapular, scapulothoracic and UE control with self care, reaching, carrying, lifting, house/yardwork, driving, computer work. Therapeutic Activities:    [] (30924 or 94390) Provided verbal/tactile cueing for activities related to improving balance, coordination, kinesthetic sense, posture, motor skill, proprioception and motor activation to allow for proper function of cervical, scapular, scapulothoracic and UE control with self care, carrying, lifting, driving/computer work.      Home Exercise Program:    [] (07913) Reviewed/Progressed HEP activities related to strengthening, flexibility, endurance, ROM of cervical, scapular, scapulothoracic and UE control with self care, reaching, carrying, lifting, house/yardwork, driving/computer work  [] (00195) Reviewed/Progressed HEP activities related to improving balance, coordination, kinesthetic sense, posture, motor skill, proprioception of cervical, scapular, scapulothoracic and UE control with self care, reaching, carrying, lifting, house/yardwork, driving/computer work      Manual Treatments:  PROM / STM / Oscillations-Mobs:  G-I, II, III, IV (PA's, Inf., Post.)  [] (30438) Provided manual therapy to mobilize soft tissue/joints of cervical/CT, scapular GHJ and UE for the purpose of decreasing headache, modulating pain, promoting relaxation,  increasing ROM, reducing/eliminating soft tissue swelling/inflammation/restriction, improving soft tissue extensibility and allowing for proper ROM for normal function with self care, reaching, carrying, lifting, house/yardwork, driving/computer work    Modalities:      Charges:  Timed Code Treatment Minutes: 40   Total Treatment Minutes: 55       [x] EVAL - LOW (10734)   [] EVAL - MOD (24845)  [] EVAL - HIGH (93827)  [] RE-EVAL (44576)  [x] WJ(47817) x  2      [] NMR (15846) x      [] Ionto  [] Manual (60662) x      [] Ultrasound  [x] TA x 1      [] Mech Traction (28317)  [] Home Management Training x   [] ES (un) (31138):           [] Aquatic    [] ES(attended) (62470)   [] Group    [] Other:    GOALS:   Patient stated goal: To move normally. []? Progressing: []? Met: []? Not Met: []? Adjusted     Therapist goals for Patient:   Short Term Goals: To be achieved in: 2 weeks  1. Independent in HEP and progression per patient tolerance, in order to prevent re-injury. []? Progressing: []? Met: []? Not Met: []? Adjusted  2. Patient will have a decrease in pain to facilitate improvement in movement, function, and ADLs as indicated by Functional Deficits. []? Progressing: []? Met: []? Not Met: []? Adjusted     Long Term Goals: To be achieved in: 6 weeks  1. Disability index score of 10% or less for the NDI to assist with reaching prior level of function. []? Progressing: []? Met: []? Not Met: []? Adjusted  2.  Patient will demonstrate increased AROM to [facility] of cervical/thoracic spine to allow for proper joint functioning as indicated by patients Functional Deficits. []? Progressing: []? Met: []? Not Met: []? Adjusted  3. Patient will demonstrate an increase in postural awareness and control and activation of  Deep cervical stabilizers to allow for proper functional mobility as indicated by patients Functional Deficits. []? Progressing: []? Met: []? Not Met: []? Adjusted  4. Patient will return to functional activities including standing for work without increased symptoms or restriction. []? Progressing: []? Met: []? Not Met: []? Adjusted  5. Pt will raise arms without UT compensation and less than 4/10 pain. []? Progressing: []? Met: []? Not Met: []? Adjusted           Overall Progression Towards Functional goals/ Treatment Progress Update:  [] Patient is progressing as expected towards functional goals listed. [] Progression is slowed due to complexities/Impairments listed. [] Progression has been slowed due to co-morbidities. [x] Plan just implemented, too soon to assess goals progression <30days   [] Goals require adjustment due to lack of progress  [] Patient is not progressing as expected and requires additional follow up with physician  [] Other    Persisting Functional Limitations/Impairments:  []Sitting [x]Standing   []Walking []Squatting/bending    []Stairs []ADL's    []Transfers []Reaching  []Housework [x]Lifting  []Driving [x]Job related tasks  [x]Sports/Recreation  []Sleeping  []Other:    ASSESSMENT:  See eval  Treatment/Activity Tolerance:  [] Patient able to complete tx  [] Patient limited by fatique  [] Patient limited by pain  [] Patient limited by other medical complications  [] Other:     Prognosis: [] Good [x] Fair  [] Poor    Patient Requires Follow-up: [x] Yes  [] No    PLAN: See eval. PT 2x / week for 6 weeks.    [] Continue per plan of care [] Alter current plan (see comments)  [x] Plan of care initiated [] Hold pending MD visit [] Discharge    Electronically signed by: Chong Allan PT, DPT       Note: If patient does not return for scheduled/ recommended follow up visits, this note will serve as a discharge from care along with most recent update on progress.
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
## 2020-07-16 ENCOUNTER — HOSPITAL ENCOUNTER (OUTPATIENT)
Dept: PHYSICAL THERAPY | Age: 25
Setting detail: THERAPIES SERIES
Discharge: HOME | End: 2020-07-16
Payer: COMMERCIAL

## 2020-07-16 PROCEDURE — 97110 THERAPEUTIC EXERCISES: CPT

## 2020-07-16 PROCEDURE — 97140 MANUAL THERAPY 1/> REGIONS: CPT

## 2020-07-16 NOTE — FLOWSHEET NOTE
[address] [facility]  Phone: [phone]   Fax: [phone]    Physical Therapy Treatment Note/ Progress Report:     Date:  2020    Patient Name:  Kelli Giordano    :  1995  MRN: [id number]  Restrictions/Precautions:    Medical/Treatment Diagnosis Information:  Diagnosis: Neck pain M50.30  Treatment Diagnosis: increased muscle guarding in the L UT, scalenes, levator scap, and SCM; decreased joint mobility C3-C7, decreased joint mobility T1-T7, decreased cervical ROM  Insurance/Certification information:  PT Insurance Information: [facility]  Physician Information:  Referring Practitioner: DES Rhoades  Plan of care signed (Y/N): []  Yes [x]  No     Date of Patient follow up with Physician:      Progress Report: []  Yes  [x]  No     Date Range for reporting period:  Beginnin20  Ending:     Progress report due (10 Rx/or 30 days whichever is less): 10    Recertification due (POC duration/ or 90 days whichever is less): POC    Visit # Insurance Allowable Auth required? Date Range    30 []  Yes  [x]  No N/A     Latex Allergy:  [x]NO      []YES  Preferred Language for Healthcare:   [x]English       []other:    Functional Scale:         Date assessed:  NDI: raw score = 10; dysfunction = 44%    20    Pain level:  8/10     SUBJECTIVE:  Pt reports that a few days ago he slipped at a pool and fell, has increased soreness this date because of this. Reports compliance with HEP. OBJECTIVE: See eval   Observation: : After 15 min of exercise pt reports feeling light headed. BP taken 99/66. Pt reports that he often get light headed. ED notes show persistent low blood pressures consistent with findings this date. Pt reports that he has an appointment with primary care physician next week, will discuss lightheadedness and low blood pressure with her at that time.     Test measurements:      RESTRICTIONS/PRECAUTIONS: No end range grade V cervical rotation manip, low BP    Exercises/Interventions:   Therapeutic Exercise (59102) Resistance / level Sets/sec Reps Notes   UBE (if tolerable)   2 min fwd, 2 min retro    Chink tucks       pulleys Flexion and scaption 60 sec     scap squeeze  3\" hold 10    scap circles       Wall walks with overpressure Flexion and scaption  10 7/16: lightheadedness reported after this exercise. Therapeutic Activities (37113)                                                 NMR re-education (59163)                                          Manual Intervention (56742)       Cerv mobs/manip       Thoracic mobs/manip       CT manip       Rib mobilizations       STM B UT, SCM, suboccipitals  10'     Manual cervical traction  5'         Patient education:  -pt educated on diagnosis, prognosis and expectations for rehab  -all pt questions were answered    Home Exercise Program:  7/14: shoulder rolls, scap squeeze, UT stretch. HO issued. Therapeutic Exercise and NMR EXR  [] (92503) Provided verbal/tactile cueing for activities related to strengthening, flexibility, endurance, ROM  for improvements in cervical, postural, scapular, scapulothoracic and UE control with self care, reaching, carrying, lifting, house/yardwork, driving/computer work.    [] (45852) Provided verbal/tactile cueing for activities related to improving balance, coordination, kinesthetic sense, posture, motor skill, proprioception  to assist with cervical, scapular, scapulothoracic and UE control with self care, reaching, carrying, lifting, house/yardwork, driving/computer work.  [] (21733) Therapist is in constant attendance of 2 or more patients providing skilled therapy interventions, but not providing any significant amount of measurable one-on-one time to either patient, for improvements in cervical, scapular, scapulothoracic and UE control with self care, reaching, carrying, lifting, house/yardwork, driving, computer work.      Therapeutic Met: []? Not Met: []? Adjusted     Therapist goals for Patient:   Short Term Goals: To be achieved in: 2 weeks  1. Independent in HEP and progression per patient tolerance, in order to prevent re-injury. []? Progressing: []? Met: []? Not Met: []? Adjusted  2. Patient will have a decrease in pain to facilitate improvement in movement, function, and ADLs as indicated by Functional Deficits. []? Progressing: []? Met: []? Not Met: []? Adjusted     Long Term Goals: To be achieved in: 6 weeks  1. Disability index score of 10% or less for the NDI to assist with reaching prior level of function. []? Progressing: []? Met: []? Not Met: []? Adjusted  2. Patient will demonstrate increased AROM to [facility] of cervical/thoracic spine to allow for proper joint functioning as indicated by patients Functional Deficits. []? Progressing: []? Met: []? Not Met: []? Adjusted  3. Patient will demonstrate an increase in postural awareness and control and activation of  Deep cervical stabilizers to allow for proper functional mobility as indicated by patients Functional Deficits. []? Progressing: []? Met: []? Not Met: []? Adjusted  4. Patient will return to functional activities including standing for work without increased symptoms or restriction. []? Progressing: []? Met: []? Not Met: []? Adjusted  5. Pt will raise arms without UT compensation and less than 4/10 pain. []? Progressing: []? Met: []? Not Met: []? Adjusted           Overall Progression Towards Functional goals/ Treatment Progress Update:  [] Patient is progressing as expected towards functional goals listed. [] Progression is slowed due to complexities/Impairments listed. [] Progression has been slowed due to co-morbidities.   [x] Plan just implemented, too soon to assess goals progression <30days   [] Goals require adjustment due to lack of progress  [] Patient is not progressing as expected and requires additional follow up with physician  [] Other    Persisting Functional Limitations/Impairments:  []Sitting [x]Standing   []Walking []Squatting/bending    []Stairs []ADL's    []Transfers []Reaching  []Housework [x]Lifting  []Driving [x]Job related tasks  [x]Sports/Recreation  []Sleeping  []Other:    ASSESSMENT:  Pt demo 1 instance of lightheadedness this date, BP taken and was low (99/66). Pt's typical BP appears to be low as pt's BP in the ED ran ~100/70 as well. Pt advised to f/u with physician regarding BP. PT continued with session as no strenuous exercise performed and  Pt no longer symptomatic after 5 min seated rest.  Pt reports pain decreased to 5/10 post session. Reports that the neck feels more flexible post manual.   Treatment/Activity Tolerance:  [x] Patient able to complete tx  [] Patient limited by fatique  [x] Patient limited by pain  [] Patient limited by other medical complications  [x] Other: low BP    Prognosis: [] Good [x] Fair  [] Poor    Patient Requires Follow-up: [x] Yes  [] No    PLAN: See eval. PT 2x / week for 6 weeks. [x] Continue per plan of care [] Alter current plan (see comments)  [] Plan of care initiated [] Hold pending MD visit [] Discharge    Electronically signed by: Katelynn Lewis, PT, DPT       Note: If patient does not return for scheduled/ recommended follow up visits, this note will serve as a discharge from care along with most recent update on progress.
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
## 2020-07-21 ENCOUNTER — HOSPITAL ENCOUNTER (OUTPATIENT)
Dept: PHYSICAL THERAPY | Age: 25
Setting detail: THERAPIES SERIES
Discharge: HOME | End: 2020-07-21
Payer: COMMERCIAL

## 2020-07-21 PROCEDURE — 97110 THERAPEUTIC EXERCISES: CPT

## 2020-07-21 PROCEDURE — 97140 MANUAL THERAPY 1/> REGIONS: CPT

## 2020-07-21 NOTE — FLOWSHEET NOTE
house/yardwork, driving, computer work. Therapeutic Activities:    [] (33004 or 73688) Provided verbal/tactile cueing for activities related to improving balance, coordination, kinesthetic sense, posture, motor skill, proprioception and motor activation to allow for proper function of cervical, scapular, scapulothoracic and UE control with self care, carrying, lifting, driving/computer work.      Home Exercise Program:    [] (77117) Reviewed/Progressed HEP activities related to strengthening, flexibility, endurance, ROM of cervical, scapular, scapulothoracic and UE control with self care, reaching, carrying, lifting, house/yardwork, driving/computer work  [] (45447) Reviewed/Progressed HEP activities related to improving balance, coordination, kinesthetic sense, posture, motor skill, proprioception of cervical, scapular, scapulothoracic and UE control with self care, reaching, carrying, lifting, house/yardwork, driving/computer work      Manual Treatments:  PROM / STM / Oscillations-Mobs:  G-I, II, III, IV (PA's, Inf., Post.)  [] (41282) Provided manual therapy to mobilize soft tissue/joints of cervical/CT, scapular GHJ and UE for the purpose of decreasing headache, modulating pain, promoting relaxation,  increasing ROM, reducing/eliminating soft tissue swelling/inflammation/restriction, improving soft tissue extensibility and allowing for proper ROM for normal function with self care, reaching, carrying, lifting, house/yardwork, driving/computer work    Modalities:      Charges:  Timed Code Treatment Minutes: 40   Total Treatment Minutes: 40       [] EVAL - LOW (06837)   [] EVAL - MOD (55926)  [] EVAL - HIGH (00718)  [] RE-EVAL (19616)  [x] ER(37807) x  2      [] NMR (43947) x      [] Ionto  [x] Manual (95798) x  1    [] Ultrasound  [] TA x 1      [] Mech Traction (84996)  [] Home Management Training x   [] ES (un) (51201):           [] Aquatic    [] ES(attended) (27220)   [] Group    [] Other:    GOALS: Patient stated goal: To move normally. []? Progressing: []? Met: []? Not Met: []? Adjusted     Therapist goals for Patient:   Short Term Goals: To be achieved in: 2 weeks  1. Independent in HEP and progression per patient tolerance, in order to prevent re-injury. []? Progressing: []? Met: []? Not Met: []? Adjusted  2. Patient will have a decrease in pain to facilitate improvement in movement, function, and ADLs as indicated by Functional Deficits. []? Progressing: []? Met: []? Not Met: []? Adjusted     Long Term Goals: To be achieved in: 6 weeks  1. Disability index score of 10% or less for the NDI to assist with reaching prior level of function. []? Progressing: []? Met: []? Not Met: []? Adjusted  2. Patient will demonstrate increased AROM to [facility] of cervical/thoracic spine to allow for proper joint functioning as indicated by patients Functional Deficits. []? Progressing: []? Met: []? Not Met: []? Adjusted  3. Patient will demonstrate an increase in postural awareness and control and activation of  Deep cervical stabilizers to allow for proper functional mobility as indicated by patients Functional Deficits. []? Progressing: []? Met: []? Not Met: []? Adjusted  4. Patient will return to functional activities including standing for work without increased symptoms or restriction. []? Progressing: []? Met: []? Not Met: []? Adjusted  5. Pt will raise arms without UT compensation and less than 4/10 pain. []? Progressing: []? Met: []? Not Met: []? Adjusted           Overall Progression Towards Functional goals/ Treatment Progress Update:  [] Patient is progressing as expected towards functional goals listed. [] Progression is slowed due to complexities/Impairments listed. [] Progression has been slowed due to co-morbidities.   [x] Plan just implemented, too soon to assess goals progression <30days   [] Goals require adjustment due to lack of progress  [] Patient is not progressing as expected and requires additional follow up with physician  [] Other    Persisting Functional Limitations/Impairments:  []Sitting [x]Standing   []Walking []Squatting/bending    []Stairs []ADL's    []Transfers []Reaching  []Housework [x]Lifting  []Driving [x]Job related tasks  [x]Sports/Recreation  []Sleeping  []Other:    ASSESSMENT:  Pt without instance of lightheadedness this session. Wall walks produce L rib pain, pt TTP in the area, massage to the area did not reduce pain. Neck pain reduced to 4.5/10 post manual techniques. Treatment/Activity Tolerance:  [x] Patient able to complete tx  [] Patient limited by fatique  [x] Patient limited by pain  [] Patient limited by other medical complications  [x] Other: low BP    Prognosis: [] Good [x] Fair  [] Poor    Patient Requires Follow-up: [x] Yes  [] No    PLAN: See eval. PT 2x / week for 6 weeks. [x] Continue per plan of care [] Alter current plan (see comments)  [] Plan of care initiated [] Hold pending MD visit [] Discharge    Electronically signed by: Heide Thomas PT, DPT       Note: If patient does not return for scheduled/ recommended follow up visits, this note will serve as a discharge from care along with most recent update on progress.

## 2020-07-23 ENCOUNTER — OFFICE VISIT (OUTPATIENT)
Dept: INTERNAL MEDICINE CLINIC | Age: 25
End: 2020-07-23
Payer: COMMERCIAL

## 2020-07-23 VITALS
HEIGHT: 68 IN | WEIGHT: 188 LBS | SYSTOLIC BLOOD PRESSURE: 96 MMHG | BODY MASS INDEX: 28.49 KG/M2 | RESPIRATION RATE: 16 BRPM | DIASTOLIC BLOOD PRESSURE: 68 MMHG | OXYGEN SATURATION: 99 % | HEART RATE: 78 BPM | TEMPERATURE: 97.3 F

## 2020-07-23 PROBLEM — T78.40XA ALLERGIC REACTION: Status: RESOLVED | Noted: 2019-02-15 | Resolved: 2020-07-23

## 2020-07-23 PROCEDURE — 99214 OFFICE O/P EST MOD 30 MIN: CPT | Performed by: NURSE PRACTITIONER

## 2020-07-23 PROCEDURE — G8417 CALC BMI ABV UP PARAM F/U: HCPCS | Performed by: NURSE PRACTITIONER

## 2020-07-23 PROCEDURE — 1036F TOBACCO NON-USER: CPT | Performed by: NURSE PRACTITIONER

## 2020-07-23 PROCEDURE — G8427 DOCREV CUR MEDS BY ELIG CLIN: HCPCS | Performed by: NURSE PRACTITIONER

## 2020-07-23 ASSESSMENT — PATIENT HEALTH QUESTIONNAIRE - PHQ9
SUM OF ALL RESPONSES TO PHQ9 QUESTIONS 1 & 2: 0
1. LITTLE INTEREST OR PLEASURE IN DOING THINGS: 0
SUM OF ALL RESPONSES TO PHQ QUESTIONS 1-9: 0
2. FEELING DOWN, DEPRESSED OR HOPELESS: 0
SUM OF ALL RESPONSES TO PHQ QUESTIONS 1-9: 0

## 2020-07-23 ASSESSMENT — ENCOUNTER SYMPTOMS
CONSTIPATION: 0
CHEST TIGHTNESS: 0
BACK PAIN: 1
DIARRHEA: 0
ABDOMINAL PAIN: 0
SHORTNESS OF BREATH: 0

## 2020-07-23 NOTE — PATIENT INSTRUCTIONS
VV in 3 months  Continue with Physical Therapy  Increase water intake  Eat regular meals  Wear appropriate footwear  Notify office if would like to see therapist

## 2020-07-30 ENCOUNTER — HOSPITAL ENCOUNTER (OUTPATIENT)
Dept: PHYSICAL THERAPY | Age: 25
Setting detail: THERAPIES SERIES
Discharge: HOME | End: 2020-07-30
Payer: COMMERCIAL

## 2020-07-30 PROCEDURE — 97140 MANUAL THERAPY 1/> REGIONS: CPT

## 2020-07-30 PROCEDURE — 97110 THERAPEUTIC EXERCISES: CPT

## 2020-07-30 NOTE — FLOWSHEET NOTE
[address] [facility]  Phone: [phone]   Fax: [phone]    Physical Therapy Treatment Note/ Progress Report:     Date:  2020    Patient Name:  Marissa Valadez    :  1995  MRN: [id number]  Restrictions/Precautions:    Medical/Treatment Diagnosis Information:  Diagnosis: Neck pain M50.30  Treatment Diagnosis: increased muscle guarding in the L UT, scalenes, levator scap, and SCM; decreased joint mobility C3-C7, decreased joint mobility T1-T7, decreased cervical ROM  Insurance/Certification information:  PT Insurance Information: [facility]  Physician Information:  Referring Practitioner: DES London  Plan of care signed (Y/N): []  Yes [x]  No     Date of Patient follow up with Physician:      Progress Report: []  Yes  [x]  No     Date Range for reporting period:  Beginnin20  Ending:     Progress report due (10 Rx/or 30 days whichever is less): 10    Recertification due (POC duration/ or 90 days whichever is less): POC    Visit # Insurance Allowable Auth required? Date Range    30 []  Yes  [x]  No N/A     Latex Allergy:  [x]NO      []YES  Preferred Language for Healthcare:   [x]English       []other:    Functional Scale:         Date assessed:  NDI: raw score = 10; dysfunction = 44%    20    Pain level:  7-8/10     SUBJECTIVE:  Pt reports that his knee is bothering him today (pt with a moderate limp coming into therapy), states that he plans to have is assessed by physician. Pain is also bad in the neck, missed his last two appointments. Physician discussed pt's low BP with him last week, recommended increasing water intake and eating regularly. OBJECTIVE: See eval   Observation: : After 15 min of exercise pt reports feeling light headed. BP taken 99/66. Pt reports that he often get light headed. ED notes show persistent low blood pressures consistent with findings this date.   Pt reports that he has an appointment with primary care physician next week, will discuss lightheadedness and low blood pressure with her at that time.  Test measurements:      RESTRICTIONS/PRECAUTIONS: No end range grade V cervical rotation manip, low BP    Exercises/Interventions:   Therapeutic Exercise (17114) Resistance / level Sets/sec Reps Notes   UBE (if tolerable)       Chink tucks       pulleys     scap squeeze  3\" hold 10    scap circles  60\"     Wall walks with overpressure 7/16: lightheadedness reported after this exercise. UT stretch  30\" 2 B    Scalenes stretch  30\" 2 B           Therapeutic Activities (15421)                                                 NMR re-education (70027)                                          Manual Intervention ([id number])       Cerv mobs/manip       Thoracic STM    7/21: Pt report L rib pain after wall walks, pain is directly overtop the 7th rib, not eased by massage to the area. TTP. CT manip       Rib mobilizations       STM B UT, SCM, suboccipitals  15'     Manual cervical traction  10'         Patient education:  -pt educated on diagnosis, prognosis and expectations for rehab  -all pt questions were answered    Home Exercise Program:  7/14: shoulder rolls, scap squeeze, UT stretch. HO issued.      Therapeutic Exercise and NMR EXR  [] (74369) Provided verbal/tactile cueing for activities related to strengthening, flexibility, endurance, ROM  for improvements in cervical, postural, scapular, scapulothoracic and UE control with self care, reaching, carrying, lifting, house/yardwork, driving/computer work.    [] (70174) Provided verbal/tactile cueing for activities related to improving balance, coordination, kinesthetic sense, posture, motor skill, proprioception  to assist with cervical, scapular, scapulothoracic and UE control with self care, reaching, carrying, lifting, house/yardwork, driving/computer work.  [] (06842) Therapist is in constant attendance of 2 or more patients providing skilled therapy interventions, but not providing any significant amount of measurable one-on-one time to either patient, for improvements in cervical, scapular, scapulothoracic and UE control with self care, reaching, carrying, lifting, house/yardwork, driving, computer work. Therapeutic Activities:    [] (24739 or 83702) Provided verbal/tactile cueing for activities related to improving balance, coordination, kinesthetic sense, posture, motor skill, proprioception and motor activation to allow for proper function of cervical, scapular, scapulothoracic and UE control with self care, carrying, lifting, driving/computer work.      Home Exercise Program:    [] (85981) Reviewed/Progressed HEP activities related to strengthening, flexibility, endurance, ROM of cervical, scapular, scapulothoracic and UE control with self care, reaching, carrying, lifting, house/yardwork, driving/computer work  [] (43636) Reviewed/Progressed HEP activities related to improving balance, coordination, kinesthetic sense, posture, motor skill, proprioception of cervical, scapular, scapulothoracic and UE control with self care, reaching, carrying, lifting, house/yardwork, driving/computer work      Manual Treatments:  PROM / STM / Oscillations-Mobs:  G-I, II, III, IV (PA's, Inf., Post.)  [] (54057) Provided manual therapy to mobilize soft tissue/joints of cervical/CT, scapular GHJ and UE for the purpose of decreasing headache, modulating pain, promoting relaxation,  increasing ROM, reducing/eliminating soft tissue swelling/inflammation/restriction, improving soft tissue extensibility and allowing for proper ROM for normal function with self care, reaching, carrying, lifting, house/yardwork, driving/computer work    Modalities:      Charges:  Timed Code Treatment Minutes: 40   Total Treatment Minutes: 40       [] EVAL - LOW (07780)   [] EVAL - MOD (68727)  [] EVAL - HIGH (94965)  [] RE-EVAL (75823)  [x] TL(19940) x  1     [] NMR (83675) x      [] Ionto  [x] Manual (69853) x  2    [] Ultrasound  [] TA x 1      [] Mech Traction (64891)  [] Home Management Training x   [] ES (un) (70596):           [] Aquatic    [] ES(attended) (34597)   [] Group    [] Other:    GOALS:   Patient stated goal: To move normally. []? Progressing: []? Met: []? Not Met: []? Adjusted     Therapist goals for Patient:   Short Term Goals: To be achieved in: 2 weeks  1. Independent in HEP and progression per patient tolerance, in order to prevent re-injury. []? Progressing: []? Met: []? Not Met: []? Adjusted  2. Patient will have a decrease in pain to facilitate improvement in movement, function, and ADLs as indicated by Functional Deficits. []? Progressing: []? Met: []? Not Met: []? Adjusted     Long Term Goals: To be achieved in: 6 weeks  1. Disability index score of 10% or less for the NDI to assist with reaching prior level of function. []? Progressing: []? Met: []? Not Met: []? Adjusted  2. Patient will demonstrate increased AROM to [facility] of cervical/thoracic spine to allow for proper joint functioning as indicated by patients Functional Deficits. []? Progressing: []? Met: []? Not Met: []? Adjusted  3. Patient will demonstrate an increase in postural awareness and control and activation of  Deep cervical stabilizers to allow for proper functional mobility as indicated by patients Functional Deficits. []? Progressing: []? Met: []? Not Met: []? Adjusted  4. Patient will return to functional activities including standing for work without increased symptoms or restriction. []? Progressing: []? Met: []? Not Met: []? Adjusted  5. Pt will raise arms without UT compensation and less than 4/10 pain. []? Progressing: []? Met: []? Not Met: []? Adjusted           Overall Progression Towards Functional goals/ Treatment Progress Update:  [] Patient is progressing as expected towards functional goals listed. [] Progression is slowed due to complexities/Impairments listed.   [] Progression has been slowed due to co-morbidities. [x] Plan just implemented, too soon to assess goals progression <30days   [] Goals require adjustment due to lack of progress  [] Patient is not progressing as expected and requires additional follow up with physician  [] Other    Persisting Functional Limitations/Impairments:  []Sitting [x]Standing   []Walking []Squatting/bending    []Stairs []ADL's    []Transfers []Reaching  []Housework [x]Lifting  []Driving [x]Job related tasks  [x]Sports/Recreation  []Sleeping  []Other:    ASSESSMENT:  Pt without instance of lightheadedness this session. Neck pain reduced to 4.5/10 post manual techniques. Treatment/Activity Tolerance:  [x] Patient able to complete tx  [] Patient limited by fatique  [x] Patient limited by pain  [] Patient limited by other medical complications  [] Other: low BP    Prognosis: [] Good [x] Fair  [] Poor    Patient Requires Follow-up: [x] Yes  [] No    PLAN: See eval. PT 2x / week for 6 weeks. [x] Continue per plan of care [] Alter current plan (see comments)  [] Plan of care initiated [] Hold pending MD visit [] Discharge    Electronically signed by: Olive Haile PT, DPT       Note: If patient does not return for scheduled/ recommended follow up visits, this note will serve as a discharge from care along with most recent update on progress.

## 2020-08-04 ENCOUNTER — HOSPITAL ENCOUNTER (OUTPATIENT)
Dept: PHYSICAL THERAPY | Age: 25
Setting detail: THERAPIES SERIES
Discharge: HOME | End: 2020-08-04
Payer: COMMERCIAL

## 2020-08-04 PROCEDURE — 97110 THERAPEUTIC EXERCISES: CPT

## 2020-08-04 PROCEDURE — 97140 MANUAL THERAPY 1/> REGIONS: CPT

## 2020-08-04 NOTE — FLOWSHEET NOTE
[address] [facility]  Phone: [phone]   Fax: [phone]    Physical Therapy Treatment Note/ Progress Report:     Date:  2020    Patient Name:  Ezekiel Rivera    :  1995  MRN: [id number]  Restrictions/Precautions:    Medical/Treatment Diagnosis Information:  Diagnosis: Neck pain M50.30  Treatment Diagnosis: increased muscle guarding in the L UT, scalenes, levator scap, and SCM; decreased joint mobility C3-C7, decreased joint mobility T1-T7, decreased cervical ROM  Insurance/Certification information:  PT Insurance Information: [facility]  Physician Information:  Referring Practitioner: Glendell Primrose, PA  Plan of care signed (Y/N): []  Yes [x]  No     Date of Patient follow up with Physician:      Progress Report: []  Yes  [x]  No     Date Range for reporting period:  Beginnin20  Ending:     Progress report due (10 Rx/or 30 days whichever is less): 10    Recertification due (POC duration/ or 90 days whichever is less): POC    Visit # Insurance Allowable Auth required? Date Range    30 []  Yes  [x]  No N/A     Latex Allergy:  [x]NO      []YES  Preferred Language for Healthcare:   [x]English       []other:    Functional Scale:         Date assessed:  NDI: raw score = 10; dysfunction = 44%    20    Pain level:  7-8/10     SUBJECTIVE:  Patient reports that his neck is bothering him still and left knee pain. Pt reports that his knee is bothering him today (pt with a moderate limp coming into therapy), states that he plans to have is assessed by physician. Pain is also bad in the neck, missed his last two appointments. Physician discussed pt's low BP with him last week, recommended increasing water intake and eating regularly. OBJECTIVE: See eval   Observation: : After 15 min of exercise pt reports feeling light headed. BP taken 99/66. Pt reports that he often get light headed.   ED notes show persistent low blood pressures consistent with findings this date. Pt reports that he has an appointment with primary care physician next week, will discuss lightheadedness and low blood pressure with her at that time.  Test measurements:      RESTRICTIONS/PRECAUTIONS: No end range grade V cervical rotation manip, low BP    Exercises/Interventions:   Therapeutic Exercise (58763) Resistance / level Sets/sec Reps Notes   UBE (if tolerable)   1.5 min fwd/,  retro    Chink tucks  CT Nods/ Turns     15    pulleys     scap squeeze     scap circles  60\"     Wall walks with overpressure 7/16: lightheadedness reported after this exercise. UT stretch  30\" 2 B    Scalenes stretch  30\" 2 B    Mid Row Blue  15x    High Row Blue  15x    Therapeutic Activities (31974)                                                 NMR re-education (76394)                                          Manual Intervention (49314)       Cerv mobs/manip       Thoracic STM    7/21: Pt report L rib pain after wall walks, pain is directly overtop the 7th rib, not eased by massage to the area. TTP. CT manip       Rib mobilizations       STM B UT, SCM, suboccipitals, PROM , side glides  13'     Manual cervical traction  10'         Patient education:  -pt educated on diagnosis, prognosis and expectations for rehab  -all pt questions were answered    Home Exercise Program:  7/14: shoulder rolls, scap squeeze, UT stretch. HO issued.      Therapeutic Exercise and NMR EXR  [] (26905) Provided verbal/tactile cueing for activities related to strengthening, flexibility, endurance, ROM  for improvements in cervical, postural, scapular, scapulothoracic and UE control with self care, reaching, carrying, lifting, house/yardwork, driving/computer work.    [] (04349) Provided verbal/tactile cueing for activities related to improving balance, coordination, kinesthetic sense, posture, motor skill, proprioception  to assist with cervical, scapular, scapulothoracic and UE control with self care, reaching, carrying, lifting, house/yardwork, driving/computer work.  [] (23661) Therapist is in constant attendance of 2 or more patients providing skilled therapy interventions, but not providing any significant amount of measurable one-on-one time to either patient, for improvements in cervical, scapular, scapulothoracic and UE control with self care, reaching, carrying, lifting, house/yardwork, driving, computer work. Therapeutic Activities:    [] (25585 or 77173) Provided verbal/tactile cueing for activities related to improving balance, coordination, kinesthetic sense, posture, motor skill, proprioception and motor activation to allow for proper function of cervical, scapular, scapulothoracic and UE control with self care, carrying, lifting, driving/computer work.      Home Exercise Program:    [] (80025) Reviewed/Progressed HEP activities related to strengthening, flexibility, endurance, ROM of cervical, scapular, scapulothoracic and UE control with self care, reaching, carrying, lifting, house/yardwork, driving/computer work  [] (46961) Reviewed/Progressed HEP activities related to improving balance, coordination, kinesthetic sense, posture, motor skill, proprioception of cervical, scapular, scapulothoracic and UE control with self care, reaching, carrying, lifting, house/yardwork, driving/computer work      Manual Treatments:  PROM / STM / Oscillations-Mobs:  G-I, II, III, IV (PA's, Inf., Post.)  [] (59083) Provided manual therapy to mobilize soft tissue/joints of cervical/CT, scapular GHJ and UE for the purpose of decreasing headache, modulating pain, promoting relaxation,  increasing ROM, reducing/eliminating soft tissue swelling/inflammation/restriction, improving soft tissue extensibility and allowing for proper ROM for normal function with self care, reaching, carrying, lifting, house/yardwork, driving/computer work    Modalities:      Charges:  Timed Code Treatment Minutes: 43   Total Treatment Minutes: 43 [] EVAL - LOW (66993)   [] EVAL - MOD (63765)  [] EVAL - HIGH (57509)  [] RE-EVAL (14960)  [x] HJ(62213) x  1     [] NMR (29155) x      [] Ionto  [x] Manual (12706) x  2    [] Ultrasound  [] TA x 1      [] Mech Traction (66344)  [] Home Management Training x   [] ES (un) (58138):           [] Aquatic    [] ES(attended) (67145)   [] Group    [] Other:    GOALS:   Patient stated goal: To move normally. []? Progressing: []? Met: []? Not Met: []? Adjusted     Therapist goals for Patient:   Short Term Goals: To be achieved in: 2 weeks  1. Independent in HEP and progression per patient tolerance, in order to prevent re-injury. []? Progressing: []? Met: []? Not Met: []? Adjusted  2. Patient will have a decrease in pain to facilitate improvement in movement, function, and ADLs as indicated by Functional Deficits. []? Progressing: []? Met: []? Not Met: []? Adjusted     Long Term Goals: To be achieved in: 6 weeks  1. Disability index score of 10% or less for the NDI to assist with reaching prior level of function. []? Progressing: []? Met: []? Not Met: []? Adjusted  2. Patient will demonstrate increased AROM to [facility] of cervical/thoracic spine to allow for proper joint functioning as indicated by patients Functional Deficits. []? Progressing: []? Met: []? Not Met: []? Adjusted  3. Patient will demonstrate an increase in postural awareness and control and activation of  Deep cervical stabilizers to allow for proper functional mobility as indicated by patients Functional Deficits. []? Progressing: []? Met: []? Not Met: []? Adjusted  4. Patient will return to functional activities including standing for work without increased symptoms or restriction. []? Progressing: []? Met: []? Not Met: []? Adjusted  5. Pt will raise arms without UT compensation and less than 4/10 pain. []? Progressing: []? Met: []? Not Met: []?  Adjusted           Overall Progression Towards Functional goals/ Treatment Progress Update:  []

## 2020-08-06 ENCOUNTER — HOSPITAL ENCOUNTER (OUTPATIENT)
Dept: PHYSICAL THERAPY | Age: 25
Setting detail: THERAPIES SERIES
Discharge: HOME | End: 2020-08-06
Payer: COMMERCIAL

## 2020-08-06 PROCEDURE — 97110 THERAPEUTIC EXERCISES: CPT

## 2020-08-06 PROCEDURE — 97140 MANUAL THERAPY 1/> REGIONS: CPT

## 2020-08-06 NOTE — FLOWSHEET NOTE
[address] [facility]  Phone: [phone]   Fax: [phone]    Physical Therapy Treatment Note/ Progress Report:     Date:  2020    Patient Name:  Bibi Causey    :  1995  MRN: [id number]  Restrictions/Precautions:    Medical/Treatment Diagnosis Information:  Diagnosis: Neck pain M50.30  Treatment Diagnosis: increased muscle guarding in the L UT, scalenes, levator scap, and SCM; decreased joint mobility C3-C7, decreased joint mobility T1-T7, decreased cervical ROM  Insurance/Certification information:  PT Insurance Information: [facility]  Physician Information:  Referring Practitioner: DES Kent  Plan of care signed (Y/N): []  Yes [x]  No     Date of Patient follow up with Physician:      Progress Report: []  Yes  [x]  No     Date Range for reporting period:  Beginnin20  Ending:     Progress report due (10 Rx/or 30 days whichever is less): 10    Recertification due (POC duration/ or 90 days whichever is less): POC    Visit # Insurance Allowable Auth required? Date Range    30 []  Yes  [x]  No N/A     Latex Allergy:  [x]NO      []YES  Preferred Language for Healthcare:   [x]English       []other:    Functional Scale:         Date assessed:  NDI: raw score = 10; dysfunction = 44%    20    Pain level:  8/10     SUBJECTIVE:  Pt reports that his neck pain is intense this date because yesterday he fell in the shower yesterday. OBJECTIVE: See eval   Observation: : After 15 min of exercise pt reports feeling light headed. BP taken 99/66. Pt reports that he often get light headed. ED notes show persistent low blood pressures consistent with findings this date. Pt reports that he has an appointment with primary care physician next week, will discuss lightheadedness and low blood pressure with her at that time.     Test measurements:      RESTRICTIONS/PRECAUTIONS: No end range grade V cervical rotation manip, low BP    Exercises/Interventions:   Therapeutic Exercise (85549) Resistance / level Sets/sec Reps Notes   UBE (if tolerable)   1.5 min fwd/,  retro    Chink tucks  CT Nods/ Turns     15    pulleys Flexion and scaption 60 sec     scap squeeze     scap circles  60\"      HOLD d/t increased pain 7/16: lightheadedness reported after this exercise. UT stretch  30\" 2 B    Scalenes stretch  30\" 2 B    Mid Row Blue  15x    Mid row on cables 4 plates 2 10    High row on cables 4 plates 2 10    LPD 2 plates 2 10    High Row Blue  15x    Therapeutic Activities (79009)                                                 NMR re-education (85679)                                          Manual Intervention (31063)       Cerv mobs/manip       Thoracic STM    7/21: Pt report L rib pain after wall walks, pain is directly overtop the 7th rib, not eased by massage to the area. TTP. CT manip       Rib mobilizations       STM B UT, SCM, suboccipitals, PROM , side glides  13'     Manual cervical traction  10'         Patient education:  -pt educated on diagnosis, prognosis and expectations for rehab  -all pt questions were answered    Home Exercise Program:  7/14: shoulder rolls, scap squeeze, UT stretch. HO issued.      Therapeutic Exercise and NMR EXR  [] (83696) Provided verbal/tactile cueing for activities related to strengthening, flexibility, endurance, ROM  for improvements in cervical, postural, scapular, scapulothoracic and UE control with self care, reaching, carrying, lifting, house/yardwork, driving/computer work.    [] (48077) Provided verbal/tactile cueing for activities related to improving balance, coordination, kinesthetic sense, posture, motor skill, proprioception  to assist with cervical, scapular, scapulothoracic and UE control with self care, reaching, carrying, lifting, house/yardwork, driving/computer work.  [] (61756) Therapist is in constant attendance of 2 or more patients providing skilled therapy interventions, but not providing any significant amount of measurable one-on-one time to either patient, for improvements in cervical, scapular, scapulothoracic and UE control with self care, reaching, carrying, lifting, house/yardwork, driving, computer work. Therapeutic Activities:    [] (38556 or 69617) Provided verbal/tactile cueing for activities related to improving balance, coordination, kinesthetic sense, posture, motor skill, proprioception and motor activation to allow for proper function of cervical, scapular, scapulothoracic and UE control with self care, carrying, lifting, driving/computer work.      Home Exercise Program:    [] (97403) Reviewed/Progressed HEP activities related to strengthening, flexibility, endurance, ROM of cervical, scapular, scapulothoracic and UE control with self care, reaching, carrying, lifting, house/yardwork, driving/computer work  [] (80275) Reviewed/Progressed HEP activities related to improving balance, coordination, kinesthetic sense, posture, motor skill, proprioception of cervical, scapular, scapulothoracic and UE control with self care, reaching, carrying, lifting, house/yardwork, driving/computer work      Manual Treatments:  PROM / STM / Oscillations-Mobs:  G-I, II, III, IV (PA's, Inf., Post.)  [] (55831) Provided manual therapy to mobilize soft tissue/joints of cervical/CT, scapular GHJ and UE for the purpose of decreasing headache, modulating pain, promoting relaxation,  increasing ROM, reducing/eliminating soft tissue swelling/inflammation/restriction, improving soft tissue extensibility and allowing for proper ROM for normal function with self care, reaching, carrying, lifting, house/yardwork, driving/computer work    Modalities:      Charges:  Timed Code Treatment Minutes: 48   Total Treatment Minutes: 48       [] EVAL - LOW (53299)   [] EVAL - MOD (32089)  [] EVAL - HIGH (73531)  [] RE-EVAL (25543)  [x] FS(59720) x  1     [] NMR (36996) x      [] Ionto  [x] Manual (33265) x  2    [] Ultrasound  [] TA x 1      [] Mech Traction (79580)  [] Home Management Training x   [] ES (un) (64604):           [] Aquatic    [] ES(attended) (63385)   [] Group    [] Other:    GOALS:   Patient stated goal: To move normally. []? Progressing: []? Met: []? Not Met: []? Adjusted     Therapist goals for Patient:   Short Term Goals: To be achieved in: 2 weeks  1. Independent in HEP and progression per patient tolerance, in order to prevent re-injury. []? Progressing: []? Met: []? Not Met: []? Adjusted  2. Patient will have a decrease in pain to facilitate improvement in movement, function, and ADLs as indicated by Functional Deficits. []? Progressing: []? Met: []? Not Met: []? Adjusted     Long Term Goals: To be achieved in: 6 weeks  1. Disability index score of 10% or less for the NDI to assist with reaching prior level of function. []? Progressing: []? Met: []? Not Met: []? Adjusted  2. Patient will demonstrate increased AROM to [facility] of cervical/thoracic spine to allow for proper joint functioning as indicated by patients Functional Deficits. []? Progressing: []? Met: []? Not Met: []? Adjusted  3. Patient will demonstrate an increase in postural awareness and control and activation of  Deep cervical stabilizers to allow for proper functional mobility as indicated by patients Functional Deficits. []? Progressing: []? Met: []? Not Met: []? Adjusted  4. Patient will return to functional activities including standing for work without increased symptoms or restriction. []? Progressing: []? Met: []? Not Met: []? Adjusted  5. Pt will raise arms without UT compensation and less than 4/10 pain. []? Progressing: []? Met: []? Not Met: []? Adjusted           Overall Progression Towards Functional goals/ Treatment Progress Update:  [] Patient is progressing as expected towards functional goals listed. [] Progression is slowed due to complexities/Impairments listed.   [] Progression has been slowed due to co-morbidities. [x] Plan just implemented, too soon to assess goals progression <30days   [] Goals require adjustment due to lack of progress  [] Patient is not progressing as expected and requires additional follow up with physician  [] Other    Persisting Functional Limitations/Impairments:  []Sitting [x]Standing   []Walking []Squatting/bending    []Stairs []ADL's    []Transfers []Reaching  []Housework [x]Lifting  []Driving [x]Job related tasks  [x]Sports/Recreation  []Sleeping  []Other:    ASSESSMENT:  Patient tolerated added parascapular and neck stabilization exercises without complications just cues to reduce upper trap compensation . The patient demonstrated posterior neck tightness and OA restrictions. Patient will benefit from further cervical stabilization and parascapular strengthening. .    Treatment/Activity Tolerance:  [x] Patient able to complete tx  [] Patient limited by fatique  [x] Patient limited by pain  [] Patient limited by other medical complications  [] Other: low BP    Prognosis: [] Good [x] Fair  [] Poor    Patient Requires Follow-up: [x] Yes  [] No    PLAN: See [facility]. PT 2x / week for 6 weeks. [x] Continue per plan of care [] Alter current plan (see comments)  [] Plan of care initiated [] Hold pending MD visit [] Discharge    Electronically signed by: Olive Haile PT, DPT       Note: If patient does not return for scheduled/ recommended follow up visits, this note will serve as a discharge from care along with most recent update on progress.

## 2020-08-13 ENCOUNTER — APPOINTMENT (OUTPATIENT)
Dept: PHYSICAL THERAPY | Age: 25
End: 2020-08-13
Payer: COMMERCIAL

## 2020-08-20 ENCOUNTER — HOSPITAL ENCOUNTER (OUTPATIENT)
Dept: PHYSICAL THERAPY | Age: 25
Setting detail: THERAPIES SERIES
Discharge: HOME | End: 2020-08-20
Payer: COMMERCIAL

## 2020-08-20 PROCEDURE — 97140 MANUAL THERAPY 1/> REGIONS: CPT

## 2020-08-20 PROCEDURE — 97110 THERAPEUTIC EXERCISES: CPT

## 2020-08-20 NOTE — FLOWSHEET NOTE
[address] [facility]  Phone: [phone]   Fax: [phone]    Physical Therapy Treatment Note/ Progress Report:     Date:  2020    Patient Name:  Naima Anaya    :  1995  MRN: [id number]  Restrictions/Precautions:    Medical/Treatment Diagnosis Information:  Diagnosis: Neck pain M50.30  Treatment Diagnosis: increased muscle guarding in the L UT, scalenes, levator scap, and SCM; decreased joint mobility C3-C7, decreased joint mobility T1-T7, decreased cervical ROM  Insurance/Certification information:  PT Insurance Information: [facility]  Physician Information:  Referring Practitioner: DES Kam  Plan of care signed (Y/N): []  Yes [x]  No     Date of Patient follow up with Physician:      Progress Report: []  Yes  [x]  No     Date Range for reporting period:  Beginnin20  Ending:     Progress report due (10 Rx/or 30 days whichever is less): 10    Recertification due (POC duration/ or 90 days whichever is less): POC    Visit # Insurance Allowable Auth required? Date Range    30 []  Yes  [x]  No N/A     Latex Allergy:  [x]NO      []YES  Preferred Language for Healthcare:   [x]English       []other:    Functional Scale:         Date assessed:  NDI: raw score = 10; dysfunction = 44%    20    Pain level:  7/10     SUBJECTIVE:  Pt reports that his pain has been terrible. Intensity is 7/10 this date. Pt reports that he feels the therapy is helping but the pain relief is temporary. Pain relief lasts about the rest of the day after therapy but by the time he goes to bed the pain returns. Pt recently purchased a new bed to see if that helps his symptoms. Pt reports the pain is mainly located in the L side in the upper trap. OBJECTIVE: See eval   Observation: : After 15 min of exercise pt reports feeling light headed. BP taken 99/66. Pt reports that he often get light headed.   ED notes show persistent low blood pressures consistent with findings this date. Pt reports that he has an appointment with primary care physician next week, will discuss lightheadedness and low blood pressure with her at that time.  Test measurements:      RESTRICTIONS/PRECAUTIONS: No end range grade V cervical rotation manip, low BP    Exercises/Interventions:   Therapeutic Exercise (58227) Resistance / level Sets/sec Reps Notes   UBE (if tolerable)   1.5 min fwd/,  retro    Chink tucks  CT Nods/ Turns         pulleys Flexion and scaption 60 sec     scap squeeze     scap circles  60\"        HOLD d/t increased pain 7/16: lightheadedness reported after this exercise. UT stretch  30\" 2 B    Scalenes stretch  30\" 2 B    pec stretch  30\" 3    Mid row on cables    High row on cables    LPD    WVs   10           LDP lime  15x    Mid row Blue  15x    High Row Blue  15x    Therapeutic Activities (43343)                                                 NMR re-education (85183)                                          Manual Intervention (53616)       Cerv mobs/manip       Thoracic STM    7/21: Pt report L rib pain after wall walks, pain is directly overtop the 7th rib, not eased by massage to the area. TTP. CT manip       Rib mobilizations       STM B UT, SCM, suboccipitals, PROM , side glides       RYANN #7  12'     Manual cervical traction           Patient education:  -pt educated on diagnosis, prognosis and expectations for rehab  -all pt questions were answered    Home Exercise Program:  8/20: t-band rows and LPD added. HO and green/blue t-bands issued. 7/14: shoulder rolls, scap squeeze, UT stretch. HO issued.      Therapeutic Exercise and NMR EXR  [] (82937) Provided verbal/tactile cueing for activities related to strengthening, flexibility, endurance, ROM  for improvements in cervical, postural, scapular, scapulothoracic and UE control with self care, reaching, carrying, lifting, house/yardwork, driving/computer work.    [] (22568) Provided verbal/tactile allowing for proper ROM for normal function with self care, reaching, carrying, lifting, house/yardwork, driving/computer work    Modalities:      Charges:  Timed Code Treatment Minutes: 45   Total Treatment Minutes: 45       [] EVAL - LOW (87867)   [] EVAL - MOD (06802)  [] EVAL - HIGH (92675)  [] RE-EVAL (75606)  [x] VR(18233) x  2     [] NMR (85762) x      [] Ionto  [x] Manual (63363) x  1    [] Ultrasound  [] TA x 1      [] Mech Traction (02861)  [] Home Management Training x   [] ES (un) (50628):           [] Aquatic    [] ES(attended) (55939)   [] Group    [] Other:    GOALS:   Patient stated goal: To move normally. []? Progressing: []? Met: []? Not Met: []? Adjusted     Therapist goals for Patient:   Short Term Goals: To be achieved in: 2 weeks  1. Independent in HEP and progression per patient tolerance, in order to prevent re-injury. []? Progressing: []? Met: []? Not Met: []? Adjusted  2. Patient will have a decrease in pain to facilitate improvement in movement, function, and ADLs as indicated by Functional Deficits. []? Progressing: []? Met: []? Not Met: []? Adjusted     Long Term Goals: To be achieved in: 6 weeks  1. Disability index score of 10% or less for the NDI to assist with reaching prior level of function. []? Progressing: []? Met: []? Not Met: []? Adjusted  2. Patient will demonstrate increased AROM to [facility] of cervical/thoracic spine to allow for proper joint functioning as indicated by patients Functional Deficits. []? Progressing: []? Met: []? Not Met: []? Adjusted  3. Patient will demonstrate an increase in postural awareness and control and activation of  Deep cervical stabilizers to allow for proper functional mobility as indicated by patients Functional Deficits. []? Progressing: []? Met: []? Not Met: []? Adjusted  4. Patient will return to functional activities including standing for work without increased symptoms or restriction. []? Progressing: []? Met: []?  Not Met: []? Adjusted  5. Pt will raise arms without UT compensation and less than 4/10 pain. []? Progressing: []? Met: []? Not Met: []? Adjusted           Overall Progression Towards Functional goals/ Treatment Progress Update:  [] Patient is progressing as expected towards functional goals listed. [] Progression is slowed due to complexities/Impairments listed. [] Progression has been slowed due to co-morbidities. [x] Plan just implemented, too soon to assess goals progression <30days   [] Goals require adjustment due to lack of progress  [] Patient is not progressing as expected and requires additional follow up with physician  [] Other    Persisting Functional Limitations/Impairments:  []Sitting [x]Standing   []Walking []Squatting/bending    []Stairs []ADL's    []Transfers []Reaching  []Housework [x]Lifting  []Driving [x]Job related tasks  [x]Sports/Recreation  []Sleeping  []Other:    ASSESSMENT:  Pt reports he enjoyed the hawkgrips manual techniques, felt it reduced symptoms better that previous manual techniques. Pt continues to require manual and verbal cueing for reduced UT compensation in most exercises. Will continue to address as able. Added rows to HEP however may want to continue to perform a few reps each session to ensure proper form. Add ESTIM NV. .    Treatment/Activity Tolerance:  [x] Patient able to complete tx  [] Patient limited by fatique  [x] Patient limited by pain  [] Patient limited by other medical complications  [] Other: low BP    Prognosis: [] Good [x] Fair  [] Poor    Patient Requires Follow-up: [x] Yes  [] No    PLAN: See eval. PT 2x / week for 6 weeks.    [x] Continue per plan of care [] Alter current plan (see comments)  [] Plan of care initiated [] Hold pending MD visit [] Discharge    Electronically signed by: Thor Woodson, PT, DPT       Note: If patient does not return for scheduled/ recommended follow up visits, this note will serve as a discharge from care along with most recent update on progress.

## 2020-08-25 ENCOUNTER — HOSPITAL ENCOUNTER (OUTPATIENT)
Dept: PHYSICAL THERAPY | Age: 25
Setting detail: THERAPIES SERIES
Discharge: HOME | End: 2020-08-25
Payer: COMMERCIAL

## 2020-08-25 PROCEDURE — 97110 THERAPEUTIC EXERCISES: CPT

## 2020-08-25 PROCEDURE — 97530 THERAPEUTIC ACTIVITIES: CPT
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
## 2020-08-27 ENCOUNTER — APPOINTMENT (OUTPATIENT)
Dept: PHYSICAL THERAPY | Age: 25
End: 2020-08-27
Payer: COMMERCIAL

## 2020-08-31 ENCOUNTER — HOSPITAL ENCOUNTER (EMERGENCY)
Age: 25
Discharge: HOME | End: 2020-08-31
Attending: EMERGENCY MEDICINE
Payer: COMMERCIAL

## 2020-08-31 VITALS
SYSTOLIC BLOOD PRESSURE: 120 MMHG | HEART RATE: 79 BPM | DIASTOLIC BLOOD PRESSURE: 81 MMHG | OXYGEN SATURATION: 97 % | WEIGHT: 180 LBS | BODY MASS INDEX: 28.25 KG/M2 | HEIGHT: 67 IN | RESPIRATION RATE: 14 BRPM | TEMPERATURE: 97.7 F

## 2020-08-31 PROCEDURE — 99282 EMERGENCY DEPT VISIT SF MDM: CPT

## 2020-08-31 RX ORDER — LIDOCAINE 50 MG/G
1 PATCH TOPICAL DAILY
Qty: 30 PATCH | Refills: 0 | Status: SHIPPED | OUTPATIENT
Start: 2020-08-31 | End: 2021-08-23

## 2020-08-31 RX ORDER — AMOXICILLIN AND CLAVULANATE POTASSIUM 875; 125 MG/1; MG/1
1 TABLET, FILM COATED ORAL 2 TIMES DAILY
Qty: 20 TABLET | Refills: 0 | Status: SHIPPED | OUTPATIENT
Start: 2020-08-31 | End: 2020-09-10

## 2020-08-31 RX ORDER — COLISTIN SULFATE, NEOMYCIN SULFATE, THONZONIUM BROMIDE AND HYDROCORTISONE ACETATE 3; 3.3; .5; 1 MG/ML; MG/ML; MG/ML; MG/ML
4 SUSPENSION AURICULAR (OTIC) 4 TIMES DAILY
Qty: 1 BOTTLE | Refills: 0 | Status: SHIPPED | OUTPATIENT
Start: 2020-08-31 | End: 2020-09-10

## 2020-08-31 ASSESSMENT — PAIN SCALES - GENERAL: PAINLEVEL_OUTOF10: 8

## 2020-08-31 ASSESSMENT — PAIN DESCRIPTION - ORIENTATION
ORIENTATION: RIGHT
ORIENTATION_2: LEFT

## 2020-08-31 ASSESSMENT — PAIN DESCRIPTION - PAIN TYPE: TYPE: ACUTE PAIN

## 2020-08-31 ASSESSMENT — PAIN DESCRIPTION - INTENSITY: RATING_2: 8

## 2020-08-31 ASSESSMENT — PAIN DESCRIPTION - LOCATION
LOCATION_2: KNEE
LOCATION: EAR

## 2020-08-31 NOTE — ED PROVIDER NOTES
[facility]        Pt Name: Anthony Vega  MRN: [id number]  [place] 1995  Date of evaluation: 8/31/2020  Provider: Ricardo Davila PA-C  PCP: MIKE Hoyt CNP     I have seen and evaluated this patient with my supervising physician Cristi Soriano MD.    [address] [place] [address]       Chief Complaint   Patient presents with   Rexgabby Kishan     c/o right sided headache and right side of face hurts and pt states \"I think i have an ear infection\" also c/o left knee pain and unable to bend it. HISTORY OF PRESENT ILLNESS   (Location, Timing/Onset, Context/Setting, Quality, Duration, Modifying Factors, Severity, Associated Signs and Symptoms)  Note limiting factors. Anthony Vega is a 30 y.o. male that presents to the emergency department with 2 different complaints. Patient states both for a week he has had some right ear pain and right-sided headache and some pain when he bites down with his jaw. Denies any injury or trauma. Denies ear discharge. Has been using a earwax eardrop in case he had some issues with his ear that is been given to him by his doctor in the past.  Denies sore throat, nausea, vomiting, sinus congestion, cough, injury or trauma or any other symptoms. Also complaining of some left knee pain for the past week. He states he works as a  and is mostly sitting and denies any injury or trauma or ever feeling a popping sensation in his leg. Denies any pain to sitting there and states he only has pain with movement or bearing weight. Denies any previous history of injuries or surgeries to his left knee. States he has been following up with an orthopedic doctor for his neck and a physical therapist and can follow-up with them. Denies rash, color change, numbness or any other symptoms for this. Rates his pain an 8 out of 10. Denies any other aggravating alleviating factors.     Nursing Some mild erythema over the canal and behind the right TM without bulging or purulence. No tenderness over the gumline or dentition. Uvula midline, no stridor, no trismus. No tenderness over the Fentress's or Stensen's duct. Nose: Nose normal.   Eyes:      General:         Right eye: No discharge. Left eye: No discharge. Neck:      Musculoskeletal: Normal range of motion. Cardiovascular:      Rate and Rhythm: Normal rate and regular rhythm. Heart sounds: No murmur. No friction rub. No gallop. Pulmonary:      Effort: Pulmonary effort is normal. No respiratory distress. Breath sounds: No stridor. No wheezing, rhonchi or rales. Abdominal:      General: Bowel sounds are normal. There is no distension. Palpations: Abdomen is soft. There is no mass. Tenderness: There is no abdominal tenderness. There is no guarding or rebound. Hernia: No hernia is present. Musculoskeletal: Normal range of motion. General: Tenderness present. No swelling. Comments: Some mild tenderness over the left anterior knee without joint warmth, erythema, rash or swelling. Full range of motion of left hip, knee, ankle. Some mild pain with flexion of the left knee but no decrease. Negative Lockman and anterior and posterior drawer. No ligamental laxity. Skin:     General: Skin is warm and dry. Findings: No erythema or rash. Neurological:      Mental Status: He is alert and oriented to person, place, and time. Cranial Nerves: No cranial nerve deficit. Psychiatric:         Behavior: Behavior normal.         DIAGNOSTIC RESULTS   LABS:    Labs Reviewed - No data to display    All other labs were within normal range or not returned as of this dictation. EKG: All EKG's are interpreted by the Emergency Department Physician in the absence of a cardiologist.  Please see their note for interpretation of EKG.       RADIOLOGY:   Non-plain film images such as CT, Ultrasound and MRI are read by the radiologist. Plain radiographic images are visualized and preliminarily interpreted by the ED Provider with the below findings:        Interpretation per the Radiologist below, if available at the time of this note:    No orders to display     No results found. PROCEDURES   Unless otherwise noted below, none     Procedures    CRITICAL CARE TIME   N/A    CONSULTS:  None      EMERGENCY DEPARTMENT COURSE and DIFFERENTIAL DIAGNOSIS/MDM:   Vitals:    Vitals:    08/31/20 0700   BP: 120/81   Pulse: 79   Resp: 14   Temp: 97.7 °F (36.5 °C)   TempSrc: Oral   SpO2: 97%   Weight: 180 lb (81.6 kg)   Height: 5' 7\" (1.702 m)       Patient was given the following medications:  Medications - No data to display        Patient presented with some right-sided headache and ear pain. Does have evidence of some mild otitis externa and otitis media. Nothing that requires wick. There is no canal edema. Low suspicion for malignant otitis externa, mastoiditis, dental abscess, Jas angina, peritonsillar abscess, facial cellulitis. Also some mild tenderness around the preauricular area and parotid. Could be some mild parotitis or just lymphadenopathy. Either way will be placed on oral Augmentin and topical Cortisporin. Also complaining of some left knee pain without any injury or trauma. Did discuss x-ray imaging with the patient but he defers at this time and I believe this is reasonable without injury or trauma and low suspicion for acute fracture. Nothing to suggest DVT, cellulitis, septic arthritis, tear occlusion, compartment syndrome or other emergent etiology. We will follow-up with orthopedics and return here for any worsening of symptoms or problems at home. FINAL IMPRESSION      1. Acute otitis media, unspecified otitis media type    2.  Acute pain of left knee          DISPOSITION/PLAN   DISPOSITION Decision To Discharge 08/31/2020 07:32:52 AM      PATIENT REFERREDTO:  MIKE Macario

## 2020-08-31 NOTE — ED PROVIDER NOTES
I independently performed a history and physical on Kelli Giordano. All diagnostic, treatment, and disposition decisions were made by myself in conjunction with the advanced practice provider. I have participated in the medical decision making and directed the treatment plan and disposition of the patient. For further details of [facility] emergency department encounter, please see the advanced practice provider's documentation. CHIEF COMPLAINT  Chief Complaint   Patient presents with   Octavio Lashawn     c/o right sided headache and right side of face hurts and pt states \"I think i have an ear infection\" also c/o left knee pain and unable to bend it. Briefly, Kelli Giordano is a 30 y.o. male  who presents to the ED complaining of 2 separate and unrelated complaints. The first complaint is right ear pain for roughly a week, with no drainage but he is convinced he has an ear infection. Pain is worse with chewing but no dental pain. No fevers. No facial swelling. No changes in hearing or ringing in the ear. No symptoms on the left side. Secondary complaint is left knee pain mostly over the suprapatellar area. No injury at all. He is a  but denies any injury. He mostly to stands at work. No history of left knee problems. No symptoms in the left calf, thigh, hip or ankle/foot. No problems in the right lower extremity. FOCUSED PHYSICAL EXAMINATION  /81   Pulse 79   Temp 97.7 °F (36.5 °C) (Oral)   Resp 14   Ht 5' 7\" (1.702 m)   Wt 180 lb (81.6 kg)   SpO2 97%   BMI 28.19 kg/m²    Focused physical examination notable for no acute distress, well-appearing, well-nourished, normal speech and mentation without obvious facial droop, no obvious rash. No obvious cranial nerve deficits on my initial exam.  Left TM clear. Left EAC normal.  Right TM is mildly injected.   Right EAC is erythematous with no profound swelling although tenderness with manipulation of the pinna and tragus. No facial swelling or trismus. MUSCULOSKELETAL:  RLE: No tenderness. 2+ DP and PT. Sensation and motor function fully intact. Full range of motion of all major joints. No erythema, bruising, or lacerations. Compartments are soft. 2+ patellar reflex. Achilles nontender and intact. Able to bear weight. No joint swelling or effusions are noted. LLE: Mild tenderness over the suprapatellar bursa with no actual patellar tenderness with apprehension testing. No joint laxity with anterior/posterior drawer testing. No knee joint ttp or effusion. No calf or shin or other LLE ttp. 2+ DP and PT. Sensation and motor function fully intact. Full range of motion of all major joints. No erythema, bruising, or lacerations. Compartments are soft. 2+ patellar reflex. Achilles nontender and intact. Able to bear weight. No joint swelling or effusions are noted. MDM:  ED course was notable for clinical evidence of right-sided otitis media with otitis externa. He will be covered empirically with Augmentin as well as topical drops for the external component. No significant swelling and therefore ear wick placement is not indicated. Regarding his left knee, he is neurovascularly intact. I suspect suprapatellar bursitis with no patellar or knee joint involvement. No evidence of erythema or warmth. Discussed x-rays but would prefer a more conservative approach at this time. Strict return precautions and follow-up as an outpatient. X-rays are unlikely to be helpful given lack of injury or any other clinical evidence of neurovascular compromise. CLINICAL IMPRESSION  1. Acute otitis media, unspecified otitis media type    2. Acute pain of left knee        DISPOSITION  Bhargav Akers was discharged to home in stable condition. I have discussed the findings of today's workup with the patient and addressed the patient's questions and concerns.   Important warning signs as well as new or worsening symptoms which would necessitate immediate return to the ED were discussed. The plan is to discharge from the ED at this time, and the patient is in stable condition. The patient acknowledged understanding is agreeable with this plan. Patient was given scripts for the following medications. I counseled patient how to take these medications. New Prescriptions    AMOXICILLIN-CLAVULANATE (AUGMENTIN) 875-125 MG PER TABLET    Take 1 tablet by mouth 2 times daily for 10 days    LIDOCAINE (LIDODERM) 5 %    Place 1 patch onto the skin daily 12 hours on, 12 hours off. NEOMYCIN-COLISTIN-HYDROCORTISONE-THONZONIUM (CORTISPORIN-TC) 3.3-3-10-0.5 MG/ML OTIC SUSPENSION    Place 4 drops in ear(s) 4 times daily for 10 days       Follow-up with:  MIKE Fink - CNP  [address] [facility]  [phone]    Schedule an appointment as soon as possible for a visit in 3 days  For re-check    Nahum Kolb MD  [address]  [address] [place] [address]  [phone]    Schedule an appointment as soon as possible for a visit   For re-check 3-5 days, As needed    [facility] Emergency Department  [address]  [phone]    As needed      This chart was created using Dragon dictation software. Efforts were made by me to ensure accuracy, however some errors may be present due to limitations of this technology.             Terrence Sevilla MD  08/31/20 4449

## 2020-09-01 ENCOUNTER — CARE COORDINATION (OUTPATIENT)
Dept: CARE COORDINATION | Age: 25
End: 2020-09-01

## 2020-09-01 NOTE — CARE COORDINATION
Patient contacted regarding recent discharge and COVID-19 risk. Discussed COVID-19 related testing which was not done at this time. Test results were not done. Patient informed of results, if available? No     Care Transition Nurse/ Ambulatory Care Manager contacted the patient by telephone to perform post discharge assessment. Verified name and  with patient as identifiers. Patient has following risk factors of: no known risk factors. CTN/ACM reviewed discharge instructions, medical action plan and red flags related to discharge diagnosis. Reviewed and educated them on any new and changed medications related to discharge diagnosis. Advised obtaining a 90-day supply of all daily and as-needed medications. Education provided regarding infection prevention, and signs and symptoms of COVID-19 and when to seek medical attention with patient who verbalized understanding. Discussed exposure protocols and quarantine from [address] you at higher risk for severe illness  and given an opportunity for questions and concerns. The patient agrees to contact the COVID-19 hotline [phone] or PCP office for questions related to their healthcare. CTN/ACM provided contact information for future reference. From CDC: Are you at higher risk for severe illness?  Wash your hands often.  Avoid close contact (6 feet, which is about two arm lengths) with people who are sick.  Put distance between yourself and other people if COVID-19 is spreading in your community.  Clean and disinfect frequently touched surfaces.  Avoid all cruise travel and non-essential air travel.  Call your healthcare professional if you have concerns about COVID-19 and your underlying condition or if you are sick. For more information on steps you can take to protect yourself, see CDC's How to 3 Stephan Power with pt for ed follow up call this date.  Reports feeling about the same, had not started antibiotics as pharmacy did not have in stock states he is picking  Them up today. Discussed icing , rest and elevating his knee for relief. States he is calling his pcp to schedule follow up and request mri. Due to pandemic reviewed covid precautions and monitoring pt agreeable. Pt will be further monitored by COVID Loop Team based on severity of symptoms and risk factors. Email: [email or web address]  phone [phone]

## 2020-10-05 ENCOUNTER — OFFICE VISIT (OUTPATIENT)
Dept: ORTHOPEDIC SURGERY | Age: 25
End: 2020-10-05
Payer: COMMERCIAL

## 2020-10-05 VITALS — TEMPERATURE: 97.7 F | BODY MASS INDEX: 28.25 KG/M2 | HEIGHT: 67 IN | WEIGHT: 180 LBS

## 2020-10-05 PROCEDURE — G8484 FLU IMMUNIZE NO ADMIN: HCPCS | Performed by: PHYSICIAN ASSISTANT

## 2020-10-05 PROCEDURE — G8417 CALC BMI ABV UP PARAM F/U: HCPCS | Performed by: PHYSICIAN ASSISTANT

## 2020-10-05 PROCEDURE — 99213 OFFICE O/P EST LOW 20 MIN: CPT | Performed by: PHYSICIAN ASSISTANT

## 2020-10-05 PROCEDURE — 1036F TOBACCO NON-USER: CPT | Performed by: PHYSICIAN ASSISTANT

## 2020-10-05 PROCEDURE — G8427 DOCREV CUR MEDS BY ELIG CLIN: HCPCS | Performed by: PHYSICIAN ASSISTANT
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
## 2020-10-07 ENCOUNTER — HOSPITAL ENCOUNTER (OUTPATIENT)
Dept: PHYSICAL THERAPY | Age: 25
Setting detail: THERAPIES SERIES
Discharge: HOME | End: 2020-10-07
Payer: COMMERCIAL

## 2020-10-07 NOTE — PROGRESS NOTES
Physical Therapy  Cancellation/No-show Note  Patient Name:  Ramin Abbott  :  1995   Date:  10/7/2020  Cancelled visits to date: 1  No-shows to date: 0    Patient status for today's appointment patient:  [x]  Cancelled 10/7 (eval)  []  Rescheduled appointment  []  No-show     Reason given by patient:  []  Patient ill  []  Conflicting appointment  []  No transportation    [x]  Conflict with work  []  No reason given  []  Other:     Comments:      Phone call information:   []  Phone call made today to patient at _ time at number provided:      []  Patient answered, conversation as follows:    []  Patient did not answer, message left as follows:  [x]  Phone call not made today    Electronically signed by:  Mai Ramirez, PT

## 2020-10-15 ENCOUNTER — HOSPITAL ENCOUNTER (OUTPATIENT)
Dept: PHYSICAL THERAPY | Age: 25
Setting detail: THERAPIES SERIES
Discharge: HOME | End: 2020-10-15
Payer: COMMERCIAL

## 2020-10-15 ENCOUNTER — TELEPHONE (OUTPATIENT)
Dept: ORTHOPEDIC SURGERY | Age: 25
End: 2020-10-15

## 2020-10-15 NOTE — PROGRESS NOTES
Physical Therapy  Cancellation/No-show Note  Patient Name:  Ramin Abbott  :  1995   Date:  10/15/2020  Cancelled visits to date: 1  No-shows to date: 1    Patient status for today's appointment patient:  [x]  Cancelled 10/7 (eval)  []  Rescheduled appointment  [x]  No-show 10/15 eval 2nd time  Reason given by patient:  []  Patient ill  []  Conflicting appointment  []  No transportation    []  Conflict with work  [x]  No reason given  []  Other:     Comments:      Phone call information:   []  Phone call made today to patient at _ time at number provided:      []  Patient answered, conversation as follows:    []  Patient did not answer, message left as follows:  [x]  Phone call not made today    Electronically signed by:  Mai Ramirez, PT

## 2020-10-15 NOTE — TELEPHONE ENCOUNTER
Called and spoke to patient letting him that the MRI has been approved through his insurance. He can call central scheduling to get scheduled. We will call him with the results once we get them in. He understands this an take about 4 days after the test is complete.        Ph: [phone] ([facility])       [address] approved Auth# [id number]

## 2020-10-28 ENCOUNTER — VIRTUAL VISIT (OUTPATIENT)
Dept: INTERNAL MEDICINE CLINIC | Age: 25
End: 2020-10-28
Payer: COMMERCIAL

## 2020-10-28 PROBLEM — M54.2 NECK PAIN: Status: ACTIVE | Noted: 2020-10-28

## 2020-10-28 PROBLEM — M25.571 ACUTE RIGHT ANKLE PAIN: Status: ACTIVE | Noted: 2020-10-28

## 2020-10-28 PROBLEM — F43.21 GRIEVING: Status: ACTIVE | Noted: 2020-10-28

## 2020-10-28 PROCEDURE — 99212 OFFICE O/P EST SF 10 MIN: CPT | Performed by: NURSE PRACTITIONER

## 2020-10-28 ASSESSMENT — VISUAL ACUITY: OU: 1

## 2020-10-28 ASSESSMENT — ENCOUNTER SYMPTOMS
ABDOMINAL PAIN: 0
CHEST TIGHTNESS: 0
DIARRHEA: 0
CONSTIPATION: 0
SHORTNESS OF BREATH: 0

## 2020-10-28 NOTE — PROGRESS NOTES
intake, drinking adequate fluids. He works indoors. He reports the dizziness rarely occurs.      Grieving  He reports grieving over the loss of his 11 month old son, in May. He states he is trying to emotionally support the mother and  is not in therapy at this time, has information from [facility] He reports the mother is in therapy. Advised him to participate in therapy, offered services here at [facility]. He is agreeable to see therapist.     Lab Results   Component Value Date    WBC 6.7 04/13/2020    HGB 14.2 04/13/2020    HCT 41.9 04/13/2020    MCV 87.2 04/13/2020     04/13/2020     Lab Results   Component Value Date     04/13/2020    K 4.0 04/13/2020     04/13/2020    CO2 24 04/13/2020    BUN 12 04/13/2020    CREATININE 1.1 04/13/2020    GLUCOSE 108 (H) 04/13/2020    CALCIUM 8.6 04/13/2020    PROT 7.1 04/13/2020    LABALBU 3.5 04/13/2020    BILITOT 0.3 04/13/2020    ALKPHOS 38 (L) 04/13/2020    AST 23 04/13/2020    ALT 17 04/13/2020    LABGLOM >60 04/13/2020    GFRAA >60 04/13/2020    AGRATIO 1.0 (L) 04/13/2020    GLOB 3.6 04/13/2020       Review of Systems   Constitutional: Negative for activity change and fever. HENT: Negative for congestion. Eyes: Negative for visual disturbance. Respiratory: Negative for chest tightness and shortness of breath. Cardiovascular: Negative for chest pain, palpitations and leg swelling. Gastrointestinal: Negative for abdominal pain, constipation and diarrhea. Endocrine: Negative for polyuria. Genitourinary: Negative for dysuria. Musculoskeletal: Negative for arthralgias and myalgias. Skin: Negative for rash. Neurological: Negative for dizziness, light-headedness and headaches. Psychiatric/Behavioral: Negative for agitation, decreased concentration and sleep disturbance. The patient is not nervous/anxious. Prior to Visit Medications    Medication Sig Taking?  Authorizing Provider   lidocaine (LIDODERM) 5 % Place 1 patch onto the skin daily 12 hours on, 12 hours off. Grant Briseno PA-C   EPINEPHrine (EPIPEN 2-MANNIE) 0.3 MG/0.3ML SOAJ injection Use as directed for allergic reaction  Vega Parisi PA-C   diphenhydrAMINE (BENADRYL) 25 MG capsule Take 1 capsule by mouth every 8 hours as needed for Itching  Nyra Pauling III, DO        Social History     Tobacco Use    Smoking status: Never Smoker    Smokeless tobacco: Never Used   Substance Use Topics    Alcohol use: No        There were no vitals filed for this visit. Estimated body mass index is 28.19 kg/m² as calculated from the following:    Height as of 10/5/20: 5' 7\" (1.702 m). Weight as of 10/5/20: 180 lb (81.6 kg). Physical Exam  Constitutional:       Appearance: Normal appearance. He is well-developed and well-groomed. HENT:      Head: Normocephalic. Right Ear: Hearing normal.      Left Ear: Hearing normal.   Eyes:      General: Lids are normal. Vision grossly intact. Neurological:      General: No focal deficit present. Mental Status: He is alert and oriented to person, place, and time. Psychiatric:         Attention and Perception: Attention normal.         Mood and Affect: Mood normal.         Speech: Speech normal.         Behavior: Behavior is cooperative. Cognition and Memory: Cognition and memory normal.         ASSESSMENT/PLAN:  1. Neck pain  -Followed by [facility]    2. Grieving  -Advised to make appointment with Dr. Jerrod Melton    3. Acute right ankle pain  -Improved      No follow-ups on file.         --MIKE Delatorre - CNP on 10/28/2020 at 2:10 PM

## 2020-11-05 ENCOUNTER — HOSPITAL ENCOUNTER (OUTPATIENT)
Dept: MRI IMAGING | Age: 25
Discharge: HOME | End: 2020-11-05
Payer: COMMERCIAL

## 2020-11-05 PROCEDURE — 72141 MRI NECK SPINE W/O DYE: CPT

## 2020-11-09 ENCOUNTER — TELEPHONE (OUTPATIENT)
Dept: ORTHOPEDIC SURGERY | Age: 25
End: 2020-11-09

## 2020-11-09 NOTE — TELEPHONE ENCOUNTER
----- Message from Gerri Haynes PA-C sent at 11/9/2020  9:52 AM EST -----  Cervical MRI shows mild degenerative changes and foraminal narrowing C3-4 and C5-6. No central canal stenosis.  Would recommend [facility]

## 2020-11-09 NOTE — TELEPHONE ENCOUNTER
Spoke to patient reviewing his MRI results. Patient would like to proceed with JOAN. A referrral was placed for Dr. Venancio Severs office.  Address and number was given to patient

## 2020-11-09 NOTE — TELEPHONE ENCOUNTER
General Question     Subject: PHONE NUMBER NEEDED FOR DR Reji Issa  Patient and /or Facility Request: Kev Fields Number: [phone]

## 2020-11-12 ENCOUNTER — VIRTUAL VISIT (OUTPATIENT)
Dept: PSYCHOLOGY | Age: 25
End: 2020-11-12
Payer: COMMERCIAL

## 2020-11-12 PROBLEM — F43.21 GRIEF AT LOSS OF CHILD: Status: ACTIVE | Noted: 2020-11-12

## 2020-11-12 PROBLEM — Z63.4 GRIEF AT LOSS OF CHILD: Status: ACTIVE | Noted: 2020-11-12

## 2020-11-12 PROBLEM — F32.1 CURRENT MODERATE EPISODE OF MAJOR DEPRESSIVE DISORDER WITHOUT PRIOR EPISODE (HCC): Status: ACTIVE | Noted: 2020-11-12

## 2020-11-12 PROCEDURE — 99214 OFFICE O/P EST MOD 30 MIN: CPT | Performed by: PSYCHOLOGIST

## 2020-11-12 NOTE — Clinical Note
Mitchell Henriquez- met with this patient and what a sad story.  I'm thinking that at some point soon, he might benefit from a low dose of Prozac to help him smooth the edges out a little bit-    Hopefully we'll meet weekly for a while    thanks
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
## 2020-11-13 PROBLEM — F43.0 ACUTE STRESS DISORDER: Status: ACTIVE | Noted: 2020-11-13

## 2020-11-19 ENCOUNTER — VIRTUAL VISIT (OUTPATIENT)
Dept: PSYCHOLOGY | Age: 25
End: 2020-11-19
Payer: COMMERCIAL

## 2020-11-19 PROCEDURE — 99214 OFFICE O/P EST MOD 30 MIN: CPT | Performed by: PSYCHOLOGIST
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
## 2020-12-03 ENCOUNTER — VIRTUAL VISIT (OUTPATIENT)
Dept: PSYCHOLOGY | Age: 25
End: 2020-12-03
Payer: COMMERCIAL

## 2020-12-03 PROCEDURE — G8428 CUR MEDS NOT DOCUMENT: HCPCS | Performed by: PSYCHOLOGIST

## 2020-12-03 PROCEDURE — 99213 OFFICE O/P EST LOW 20 MIN: CPT | Performed by: PSYCHOLOGIST

## 2020-12-03 NOTE — PROGRESS NOTES
Behavioral Health Consultation  Jordan Zabala, Ph.D.  Psychologist  12/3/2020  4:30 PM EST      Time spent with Patient: 15 minutes  This is patient's third [facility] appointment. Reason for Consult:    Chief Complaint   Patient presents with    Anxiety    Depression    Stress    Other     grief over death of his 11 month old son     Referring Provider: MIKE Sanchez-GEOFF    Pt provided informed consent for the behavioral health program. Discussed with patient model of service to include the limits of confidentiality (i.e. abuse reporting, suicide intervention, etc.) and short-term intervention focused approach. Pt indicated understanding. Feedback given to PCP. TELEHEALTH VISIT -- Audio/Visual (During SDKXU-89 public health emergency)  }  Pursuant to the emergency declaration under the [facility], [facility] waiver authority and the [employer] and [employer] Act, this Virtual Visit was conducted, with patient's consent, to reduce the patient's risk of exposure to COVID-19 and provide continuity of care for an established patient. Services were provided through a video synchronous discussion virtually to substitute for in-person clinic visit. Pt gave verbal informed consent to participate in telehealth services. Conducted a risk-benefit analysis and determined that the patient's presenting problems are consistent with the use of telepsychology. Determined that the patient has sufficient knowledge and skills in the use of technology enabling them to adequately benefit from telepsychology. It was determined that this patient was able to be properly treated without an in-person session. Patient verified that they were currently located at the [facility] address that was provided during registration.     Verified the following information:  Patient's identification: Yes  Patient location: [address] [place] [address]  Patient's call back number: [phone]   Patient's emergency contact's name and number, as well as permission to contact them if needed: Extended Emergency Contact Information  Primary Emergency Contact: Oscar Rangel [address] Phone: [phone]  Mobile Phone: [phone]  Relation: Parent  Secondary Emergency Contact: Pennie Baires Phone: [phone]  Mobile Phone: [phone]  Relation: Parent     Provider location: [facility]:  Patient reports feeling better for the \"last week and a half. \" He said that he's spending a lot more time with his father and fiance, which is what he had said he needed to feel better. He said that he and his father spent hours on the phone, just talking, which the patient said was new for them. He said that he and oneyda are having difficulty with communication, saying that she gets defensive when he tells her his feelings, and also that they seem to take things the wrong way, which causes difficulty. He said that he is working a lot of \"doubles,\" which brings in more money, which makes him feel productive and taking care of the family (they have a 10year-old son).  But overall, he said that he is doing better    O:  MSE:    Appearance: good hygiene   Attitude: cooperative and friendly  Consciousness: alert  Orientation: oriented to person, place, time, general circumstance  Memory: recent and remote memory intact  Attention/Concentration: intact during session  Psychomotor Activity:normal  Eye Contact: normal  Speech: normal rate and volume, well-articulated  Mood: less anxious  Affect: congruent  Perception: within normal limits  Thought Content: within normal limits  Thought Process: logical, coherent and goal-directed  Insight: good  Judgment: intact  Ability to understand instructions: Yes  Ability to respond meaningfully: Yes  Morbid Ideation: no   Suicide Assessment: no suicidal ideation, plan, or intent  Homicidal Ideation: no    History:    Medications:   Current Outpatient Medications   Medication Sig Dispense Refill    lidocaine (LIDODERM) 5 % Place 1 patch onto the skin daily 12 hours on, 12 hours off. 30 patch 0    EPINEPHrine (EPIPEN 2-MANNIE) 0.3 MG/0.3ML SOAJ injection Use as directed for allergic reaction 1 each 0    diphenhydrAMINE (BENADRYL) 25 MG capsule Take 1 capsule by mouth every 8 hours as needed for Itching 20 capsule 0     No current facility-administered medications for this visit. Social History:   Social History     Socioeconomic History    Marital status: Single     Spouse name: Not on file    Number of children: Not on file    Years of education: Not on file    Highest education level: Not on file   Occupational History    Occupation:    Social Needs    Financial resource strain: Not on file    Food insecurity     Worry: Not on file     Inability: Not on file   [employer] needs     Medical: Not on file     Non-medical: Not on file   Tobacco Use    Smoking status: Never Smoker    Smokeless tobacco: Never Used   Substance and Sexual Activity    Alcohol use: No    Drug use: No    Sexual activity: Yes     Partners: Female   Lifestyle    Physical activity     Days per week: Not on file     Minutes per session: Not on file    Stress: Not on file   Relationships    Social connections     Talks on phone: Not on file     Gets together: Not on file     Attends Roman Catholic service: Not on file     Active member of club or organization: Not on file     Attends meetings of clubs or organizations: Not on file     Relationship status: Not on file    Intimate partner violence     Fear of current or ex partner: Not on file     Emotionally abused: Not on file     Physically abused: Not on file     Forced sexual activity: Not on file   Other Topics Concern    Not on file   Social History Narrative    Lives with significant other and son     TOBACCO:   reports that he has never smoked.  He has never used smokeless tobacco.  ETOH:   reports no history of alcohol use. Family History:   Family History   Problem Relation Age of Onset    No Known Problems Mother     No Known Problems Father        A:  We discussed what he was doing that was contributing to him feeling better. We talked about active listening, and how repeating back what someone just said for clarification can reduce conflicts that grow out of incorrect assumptions. We talked about how often people think that they are expressing feelings, when what may be being expressed is opinion. We talked about making sure he is using feeling words when he is expressing his feelings, and that that may reduce defensiveness in his fiance's part. His affect and mood were much more positive this visit. Diagnosis:    1. Acute stress disorder    2.  Current moderate episode of major depressive disorder without prior episode [facility])          Diagnosis Date    Acute stress disorder 11/13/2020    Current moderate episode of major depressive disorder without prior episode [facility] 11/12/2020    Herpes 02/2015    Low back pain 6/4/2015    Migraines     Neck pain      Plan:  Pt interventions:  Discussed prevalence of  grief for general population, Discussed self-care (sleep, nutrition, rewarding activities, social support, exercise), Established rapport, Supportive techniques and Provided Psychoeducation re: the process of grief    Pt Behavioral Change Plan:   Pt will begin utilizing deep breathing as a method of emotion regulation   Pt will continue to self-monitor his language with his fiance  Pt will have F/U appointment in 2 weeks

## 2020-12-08 NOTE — PROGRESS NOTES
PATIENT REACHED   YES__X__NO____    PREOP INSTUCTIONS   There is a one visitor policy at [facility] for all surgeries and endoscopies. Whether the visitor can stay or will be asked to wait in the car will depend on the current policy and if social distancing can be maintained. The policy is subject to change at any time. Please make sure the visitor has a cell phone that is on,charged and able to accept calls, as this may be the way that the staff communicates with them. Pain management is NO VISITOR policyThe patients ride is expected to remain in the car with a cell phone for communication. If the ride is leaving the hospital grounds please make sure they are back in time for pickup. Have the patient inform the staff on arrival what their rides plans are while the patient is in the facility. At the MAIN there is one visitor allowed. Please note that the visitor policy is subject to change. DATE__12/10_______ TIME__1315_______ARRIVAL__1215______PLACE__masc__________  NOTHING TO EAT OR DRINK  AFTER MIDNIGHT THE EVENING PRIOR OR AS INSTRUCTED BY YOUR DR.   [place] NEED A RESPONSIBLE ADULT AGE 18 OR OLDER TO DRIVE YOU HOME  PLEASE BRING INSURANCE CARD. PICTURE ID AND COMPLETE LIST OF MEDS  WEAR LOOSE COMFORTABLE CLOTHING  FOLLOW ANY INSTRUCTIONS YOUR DRS OFFICE HAS GIVEN YOU,INCLUDING WHAT MEDICATIONS TO TAKE THE AM OF PROCEDURE AND WHEN AND IF YOU NEED TO STOP ANY BLOOD THINNERS. IF YOU HAVE QUESTIONS REGARDING THIS CALL THE OFFICE  THE GOAL BLOOD SUGAR THE AM OF PROCEDURE  OR LESS ABOVE THAT THE PROCEDURE MAY BE CANCELLED  ANY QUESTIONS CALL YOUR DOCTOR. ALSO,PLEASE READ THE INSTRUCTION PACKET FROM YOUR DR IF YOU RECEIVED ONE.   SPINE INTERVENTION NUMBER [phone]

## 2020-12-10 ENCOUNTER — HOSPITAL ENCOUNTER (OUTPATIENT)
Age: 25
Setting detail: OUTPATIENT SURGERY
Discharge: HOME | End: 2020-12-10
Attending: PHYSICAL MEDICINE & REHABILITATION | Admitting: PHYSICAL MEDICINE & REHABILITATION
Payer: COMMERCIAL

## 2020-12-10 ENCOUNTER — APPOINTMENT (OUTPATIENT)
Dept: GENERAL RADIOLOGY | Age: 25
End: 2020-12-10
Attending: PHYSICAL MEDICINE & REHABILITATION
Payer: COMMERCIAL

## 2020-12-10 VITALS
DIASTOLIC BLOOD PRESSURE: 77 MMHG | SYSTOLIC BLOOD PRESSURE: 112 MMHG | HEART RATE: 71 BPM | RESPIRATION RATE: 14 BRPM | OXYGEN SATURATION: 98 % | TEMPERATURE: 98.8 F

## 2020-12-10 PROCEDURE — 2580000003 HC RX 258: Performed by: PHYSICAL MEDICINE & REHABILITATION

## 2020-12-10 PROCEDURE — 3610000054 HC PAIN LEVEL 3 BASE (NON-OR): Performed by: PHYSICAL MEDICINE & REHABILITATION

## 2020-12-10 PROCEDURE — 2709999900 HC NON-CHARGEABLE SUPPLY: Performed by: PHYSICAL MEDICINE & REHABILITATION

## 2020-12-10 PROCEDURE — 6360000002 HC RX W HCPCS: Performed by: PHYSICAL MEDICINE & REHABILITATION

## 2020-12-10 PROCEDURE — 99152 MOD SED SAME PHYS/QHP 5/>YRS: CPT | Performed by: PHYSICAL MEDICINE & REHABILITATION

## 2020-12-10 PROCEDURE — 2500000003 HC RX 250 WO HCPCS: Performed by: PHYSICAL MEDICINE & REHABILITATION

## 2020-12-10 PROCEDURE — 3209999900 FLUORO FOR SURGICAL PROCEDURES

## 2020-12-10 RX ORDER — FENTANYL CITRATE 50 UG/ML
INJECTION, SOLUTION INTRAMUSCULAR; INTRAVENOUS
Status: COMPLETED | OUTPATIENT
Start: 2020-12-10 | End: 2020-12-10

## 2020-12-10 RX ORDER — DEXAMETHASONE SODIUM PHOSPHATE 10 MG/ML
INJECTION, SOLUTION INTRAMUSCULAR; INTRAVENOUS
Status: COMPLETED | OUTPATIENT
Start: 2020-12-10 | End: 2020-12-10

## 2020-12-10 RX ORDER — LIDOCAINE HYDROCHLORIDE 10 MG/ML
INJECTION, SOLUTION EPIDURAL; INFILTRATION; INTRACAUDAL; PERINEURAL
Status: COMPLETED | OUTPATIENT
Start: 2020-12-10 | End: 2020-12-10

## 2020-12-10 RX ORDER — MIDAZOLAM HYDROCHLORIDE 1 MG/ML
INJECTION INTRAMUSCULAR; INTRAVENOUS
Status: COMPLETED | OUTPATIENT
Start: 2020-12-10 | End: 2020-12-10

## 2020-12-10 RX ORDER — SODIUM CHLORIDE 9 MG/ML
INJECTION INTRAVENOUS
Status: COMPLETED | OUTPATIENT
Start: 2020-12-10 | End: 2020-12-10

## 2020-12-10 ASSESSMENT — PAIN DESCRIPTION - FREQUENCY
FREQUENCY: CONTINUOUS
FREQUENCY: CONTINUOUS

## 2020-12-10 ASSESSMENT — PAIN DESCRIPTION - ORIENTATION
ORIENTATION: LEFT
ORIENTATION: LEFT

## 2020-12-10 ASSESSMENT — PAIN DESCRIPTION - DESCRIPTORS
DESCRIPTORS: ACHING;THROBBING
DESCRIPTORS: ACHING;THROBBING
DESCRIPTORS: ACHING

## 2020-12-10 ASSESSMENT — PAIN DESCRIPTION - LOCATION
LOCATION: SHOULDER
LOCATION: SHOULDER

## 2020-12-10 ASSESSMENT — PAIN DESCRIPTION - PAIN TYPE
TYPE: CHRONIC PAIN
TYPE: CHRONIC PAIN

## 2020-12-10 ASSESSMENT — PAIN - FUNCTIONAL ASSESSMENT: PAIN_FUNCTIONAL_ASSESSMENT: 0-10

## 2020-12-10 ASSESSMENT — PAIN SCALES - GENERAL
PAINLEVEL_OUTOF10: 8
PAINLEVEL_OUTOF10: 7

## 2020-12-10 NOTE — OP NOTE
PATIENT:  Hunter Muniz  AGE:  30 yrs  MEDICAL RECORD #:  [id number]  YOB: 1995     DATE:  12/10/2020  PHYSICIAN: Fabricio Gee M.D. PROCEDURE: C7-T1 interlaminar epidural steroid injection under fluoroscopy. PRE-OP DIAGNOSIS:  Neck Pain/Radiculopathy     POST-OP DIAGNOSIS:  same     HISTORY OF PRESENT ILLNESS:  See office notes. Patient has failed previous less-invasive treatments. Pre-op pain score: 7    Post-op pain score: 0     ALLERGIES:  Lactose     MEDICATIONS:    No current facility-administered medications for this encounter. PHYSICAL EXAMINATION:              General:  Awake, alert              Heart:  No audible murmurs, extremities well perfused              Lungs:  No increased WOB or audible wheezing              Extremities:  Normal tone. Warm. No swelling. Anesthesia: 1 mg Versed and 50 mcg fentanyl    Estimated blood loss: None    DESCRIPTION OF PROCEDURE:     Components of the procedure were again reviewed with the patient prior to the procedure. He is aware of risks including infection, bleeding, allergic reaction, and nerve injury. He had ample opportunity for additional questions. He elected to proceed with treatment. The patient was placed in the prone position. Cardiovascular monitoring was initiated, and vital signs were stable prior to, during, and after the procedure. Utilizing fluoroscopy, the C7,T1 vertebrae were identified. The area was sterilely prepped and draped. Skin anesthesia was achieved at the entry site using 1-2 cc of Lidocaine 1%. A 22 g 3.50 inch ToSocialmoth spinal needle was slowly inserted into the C7-T1 interlaminar epidural space using AP, lateral and oblique fluoroscopic imaging and loss of resistance technique. Negative aspiration was confirmed. 1 cc Isovue-M 300 was injected showing contrast spread into the epidural space. A combination of 2 cc normal saline and 10 mg dexamethasone were slowly injected.  The needle was removed after the stylet was repositioned. A sterile bandage was applied. The patient was brought to recovery in stable condition. The patient tolerated the procedure well. DISPOSITION:  The patient was transported to recovery. The patient was monitored for 15 to 20 minutes post-procedure. Precautions were discussed and written instructions provided.     Comment: Caudal epidural flow

## 2020-12-10 NOTE — H&P
HISTORY AND PHYSICAL/PRE-SEDATION ASSESSMENT    Patient:  Tyler Pastor   :  1995  Medical Record No.:  [id number]   Date:  12/10/2020  Physician:  Lorraine Pantoja MD  Facility: [address] [place] [address] Drive:                 The patient is a 30 y.o. male whom presents with arm pain. Review of the imaging and physical exam of the patient confirmed the pre-procedure diagnosis. After a thorough discussion of risks, benefits and alternatives informed consent was obtained. Diagnosis:  K81.723    Past Medical History:   Past Medical History:   Diagnosis Date    Acute stress disorder 2020    Current moderate episode of major depressive disorder without prior episode ([facility] 2020    Herpes 2015    Low back pain 2015    Migraines     Neck pain         Past Surgical History:     No past surgical history on file. Current Medications:   Prior to Admission medications    Medication Sig Start Date End Date Taking? Authorizing Provider   lidocaine (LIDODERM) 5 % Place 1 patch onto the skin daily 12 hours on, 12 hours off. 20  Yes Forest Cline PA-C   diphenhydrAMINE (BENADRYL) 25 MG capsule Take 1 capsule by mouth every 8 hours as needed for Itching 18  Yes Julianna Shearer III, DO   EPINEPHrine (EPIPEN 2-MANNIE) 0.3 MG/0.3ML SOAJ injection Use as directed for allergic reaction 19   Pao Rajan PA-C       Allergies:  Lactose    Social History:    reports that he has never smoked. He has never used smokeless tobacco. He reports that he does not drink alcohol or use drugs. Family History:   Family History   Problem Relation Age of Onset    No Known Problems Mother     No Known Problems Father         Vitals: Blood pressure 108/80, pulse 70, temperature 98.8 °F (37.1 °C), resp. rate 16, SpO2 100 %. PHYSICAL EXAM:  HENT: Airway patent and reviewed  Cardiovascular: Normal rate, regular rhythm, normal heart sounds. Pulmonary/Chest: No wheezes. No rhonchi. No rales. Abdominal: Soft. Bowel sounds are normal. No distension. Extremities: Moves all extremities equally  Lumbar Spine: Painful range of motion, no midline tenderness     ASA CLASS:         []   I. Normal, healthy adult           [x]   II.  Mild systemic disease            []   III. Severe systemic disease    Mallampati: Mallampati Class II - (soft palate, fauces & uvula are visible)      Sedation plan:   []  Local              [x]  Minimal                  []  General anesthesia    Treatment plan:  Patient's condition acceptable for planned procedure/sedation. Proceed with planned procedure   Post Procedure Plan   Return to same level of care   ______________________     The risks and benefits as well as alternatives to the procedure have been discussed with the patient and or family. The patient and/or next of kin understands and agrees to proceed.     Marine Amaya MD

## 2020-12-10 NOTE — PROGRESS NOTES
IV discontinued, catheter intact, and dressing applied. Procedural dressing dry and intact. Bilateral upper extremities equal in strength. Discharge instructions reviewed with patient or responsible adult, signed and copy given. All home medications have been reviewed. All questions answered and patient or responsible adult verbalized understanding.   PAIN LEVEL AT DISCHARGE __7___

## 2020-12-17 RX ORDER — ACYCLOVIR 800 MG/1
800 TABLET ORAL 2 TIMES DAILY
Qty: 10 TABLET | Refills: 0 | Status: SHIPPED | OUTPATIENT
Start: 2020-12-17 | End: 2022-08-03 | Stop reason: SDUPTHER

## 2021-01-11 ENCOUNTER — OFFICE VISIT (OUTPATIENT)
Dept: PRIMARY CARE CLINIC | Age: 26
End: 2021-01-11
Payer: COMMERCIAL

## 2021-01-11 DIAGNOSIS — Z20.828 EXPOSURE TO SARS-ASSOCIATED CORONAVIRUS: Primary | ICD-10-CM

## 2021-01-11 PROCEDURE — 99211 OFF/OP EST MAY X REQ PHY/QHP: CPT | Performed by: NURSE PRACTITIONER

## 2021-01-11 PROCEDURE — G8428 CUR MEDS NOT DOCUMENT: HCPCS | Performed by: NURSE PRACTITIONER

## 2021-01-11 PROCEDURE — G8417 CALC BMI ABV UP PARAM F/U: HCPCS | Performed by: NURSE PRACTITIONER

## 2021-01-11 NOTE — PROGRESS NOTES
Darryl Harris received a viral test for COVID-19. They were educated on isolation and quarantine as appropriate. For any symptoms, they were directed to seek care from their PCP, given contact information to establish with a doctor, directed to an urgent care or the emergency room.

## 2021-01-11 NOTE — PATIENT INSTRUCTIONS
You have received a viral test for COVID-19. Below is education on quarantine per the CDC guidelines. For any symptoms, seek care from your PCP, call [phone] to establish care with a doctor, or go directly to an urgent care or the emergency room. Test results will take 2-7 days and will be sent to you in your [employer] account. If you test positive, you will be contacted via phone. If you test negative, the ONLY communication will be through [address] GO TO [employer] AND SIGN UP FOR [employer]  (LOWER LEFT OF THE HOME PAGE)  No test is 100%. If you have symptoms, you should follow the guidance of quarantine as previously stated. You can still be contagious if you have symptoms. Your [facility] will reach out to you if you have a positive result. They will provide you with a return to work date and note. If you were tested for a pre-op, then you should remain in quarantine until your procedure. How do I know if I need to be in quarantine? If you live in a community where COVID-19 is or might be spreading (currently, that is virtually everywhere in the [place])  Be alert for symptoms. Watch for fever, cough, shortness of breath, or other symptoms of COVID-19.  ? Take your temperature if symptoms develop. ? Practice social distancing. Maintain 6 feet of distance from others and stay out of crowded places. ? Follow CDC guidance if symptoms develop. If you feel healthy but:  ? Recently had close contact with a person with COVID-19 you need to Quarantine:  ? Stay home until 14 days after your last exposure. ? Check your temperature twice a day and watch for symptoms of COVID-19.  ? If possible, stay away from people who are at higher-risk for getting very sick from COVID-19. Stay Home and Monitor Your Health if you:  ? Have been diagnosed with COVID-19, or  ? Are waiting for test results, or  ?  Have cough, fever, or shortness of breath, or symptoms of COVID-19 When You Can be Around Others After You Had or Likely Had COVID-19     If you have or think you might have COVID-19, it is important to stay home and away from other people. Staying away from others helps stop the spread of COVID-19. If you have an emergency warning sign (including trouble breathing), get emergency medical care immediately. When you can be around others (end home isolation) depends on different factors for different situations. Find CDC's recommendations for your situation below. I think or know I had COVID-19, and I had symptoms  You can be with others after  ? 3 days with no fever and  ? Respiratory symptoms have improved (e.g. cough, shortness of breath) and  ? 10 days since symptoms first appeared  Depending on your healthcare provider's advice and availability of testing, you might get tested to see if you still have COVID-19. If you will be tested, you can be around others when you have no fever, respiratory symptoms have improved, and you receive two negative test results in a row, at least 24 hours apart. I tested positive for COVID-19 but had no symptoms  If you continue to have no symptoms, you can be with others after:  ? 10 days have passed since test or 14 days since your exposure test   Depending on your healthcare provider's advice and availability of testing, you might get tested to see if you still have COVID-19. If you will be tested, you can be around others after you receive two negative test results in a row, at least 24 hours apart. If you develop symptoms after testing positive, follow the guidance above for I think or know I had COVID, and I had symptoms.   For Anyone Who Has Been Around a Person with COVID-19  It is important to remember that anyone who has close contact with someone with COVID-19 should stay home for 14 days after exposure based on the time it takes to develop illness. Testing is not necessary.     www.cdc.gov/coronavirus/2019-ncov/index.html

## 2021-01-12 LAB — SARS-COV-2, NAA: NOT DETECTED

## 2021-02-08 ENCOUNTER — TELEPHONE (OUTPATIENT)
Dept: PRIMARY CARE CLINIC | Age: 26
End: 2021-02-08

## 2021-02-08 NOTE — TELEPHONE ENCOUNTER
----- Message from MIKE Rudolph CNP sent at 2/6/2021  5:33 PM EST -----  Regarding: VV/OV  Please schedule him an appointment Vv/OV.  Whichever is fine

## 2021-02-08 NOTE — TELEPHONE ENCOUNTER
I attempted to contact rPimitivo Vazquez, he did not answer and his voicemail is not currently set up.

## 2021-03-29 NOTE — PROGRESS NOTES
[address]  [address] [facility] [place] [address]  Dept: [phone]       3/30/2021     Charmayne Dallas (:  1995)is a 30 y.o. male, here for evaluation of the following medical concerns: Foot Pain   The pain is present in the left foot and right foot. This is a chronic problem. The current episode started more than 1 month ago. The problem occurs intermittently. The quality of the pain is described as aching. The pain is moderate. Associated symptoms include tingling. Pertinent negatives include no fever, itching, joint swelling, numbness or stiffness. He has tried rest for the symptoms. Family history does not include gout or rheumatoid arthritis. There is no history of diabetes, gout or rheumatoid arthritis. 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 He has changed shoes and inserts. Pain persists. Denies recent injury or fall. Lab Results   Component Value Date    WBC 6.7 2020    HGB 14.2 2020    HCT 41.9 2020    MCV 87.2 2020     2020     Lab Results   Component Value Date     2020    K 4.0 2020     2020    CO2 24 2020    BUN 12 2020    CREATININE 1.1 2020    GLUCOSE 108 (H) 2020    CALCIUM 8.6 2020    PROT 7.1 2020    LABALBU 3.5 2020    BILITOT 0.3 2020    ALKPHOS 38 (L) 2020    AST 23 2020    ALT 17 2020    LABGLOM >60 2020    GFRAA >60 2020    AGRATIO 1.0 (L) 2020    GLOB 3.6 2020       Review of Systems   Constitutional: Negative for activity change and fever. HENT: Negative for congestion. Eyes: Negative for visual disturbance. Respiratory: Negative for chest tightness and shortness of breath. Cardiovascular: Negative for chest pain, palpitations and leg swelling. Gastrointestinal: Negative for abdominal pain, constipation and diarrhea. Endocrine: Negative for polyuria. Genitourinary: Negative for dysuria. Musculoskeletal: Negative for arthralgias, gout, myalgias and stiffness. Pain on lateral aspect of both feet. Left pain greater than right. Skin: Negative for itching and rash. Allergic/Immunologic:        Taking benadryl for allergic rhinitis   Neurological: Positive for tingling. Negative for dizziness, light-headedness, numbness and headaches. Psychiatric/Behavioral: Negative for agitation, decreased concentration and sleep disturbance. The patient is not nervous/anxious. Prior to Visit Medications    Medication Sig Taking? Authorizing Provider   diphenhydrAMINE (BENADRYL) 25 MG capsule Take 1 capsule by mouth every 8 hours as needed for Itching Yes MIKE Goss CNP   lidocaine (LIDODERM) 5 % Place 1 patch onto the skin daily 12 hours on, 12 hours off. Yes Anitha Antoine PA-C   EPINEPHrine (EPIPEN 2-MANNIE) 0.3 MG/0.3ML SOAJ injection Use as directed for allergic reaction Yes Dakota Scruggs PA-C        Social History     Tobacco Use    Smoking status: Never Smoker    Smokeless tobacco: Never Used   Substance Use Topics    Alcohol use: No        Vitals:    03/30/21 1606   BP: 104/68   Pulse: 85   Temp: 98.7 °F (37.1 °C)   TempSrc: Temporal   SpO2: 98%   Weight: 197 lb 6.4 oz (89.5 kg)     Estimated body mass index is 30.92 kg/m² as calculated from the following:    Height as of 10/5/20: 5' 7\" (1.702 m). Weight as of this encounter: 197 lb 6.4 oz (89.5 kg). Physical Exam  Vitals signs reviewed. Constitutional:       Appearance: He is well-developed. He is not diaphoretic. HENT:      Head: Normocephalic. Right Ear: Hearing and external ear normal. No tenderness. Left Ear: Hearing and external ear normal. No tenderness.       Nose: Nose normal.   Eyes:      General: Lids are normal.   Cardiovascular:      Rate and Rhythm: Normal rate and regular rhythm. Heart sounds: Normal heart sounds, S1 normal and S2 normal.   Pulmonary:      Effort: Pulmonary effort is normal.      Breath sounds: Normal breath sounds. Musculoskeletal: Normal range of motion. Feet:    Lymphadenopathy:      Head:      Right side of head: No submental or submandibular adenopathy. Left side of head: No submental or submandibular adenopathy. Cervical:      Right cervical: No superficial cervical adenopathy. Left cervical: No superficial cervical adenopathy. Skin:     General: Skin is warm and dry. Findings: No rash. Nails: There is no clubbing. Neurological:      Mental Status: He is alert and oriented to person, place, and time. GCS: GCS eye subscore is 4. GCS verbal subscore is 5. GCS motor subscore is 6. Psychiatric:         Speech: Speech normal.         Behavior: Behavior normal. Behavior is cooperative. Judgment: Judgment normal.         ASSESSMENT/PLAN:  1. Bilateral foot pain    - Amb External Referral To Podiatry      Return in about 6 months (around 9/30/2021).    Reviewed patient's pertinent medical history, relevant laboratory results, imaging studies, and health maintenance. Medications have been reviewed and discussed with the patient, refills otherwise up-to-date. Discussed the importance of adhering to current medication regimen. Advised:  (1) continue to work on eating a healthy balanced diet; (2) stay active by exercising within your personal limits.  Patient was advised to keep future appointments with their respective specialty care team(s). Questions and concerns addressed, care plan reviewed and patient is agreeable with the care plan following 308 MIKE Rodrigez CNP on 4/1/2021 at 8:52 PM

## 2021-03-30 ENCOUNTER — OFFICE VISIT (OUTPATIENT)
Dept: PRIMARY CARE CLINIC | Age: 26
End: 2021-03-30
Payer: COMMERCIAL

## 2021-03-30 VITALS
SYSTOLIC BLOOD PRESSURE: 104 MMHG | DIASTOLIC BLOOD PRESSURE: 68 MMHG | TEMPERATURE: 98.7 F | BODY MASS INDEX: 30.92 KG/M2 | OXYGEN SATURATION: 98 % | HEART RATE: 85 BPM | WEIGHT: 197.4 LBS

## 2021-03-30 DIAGNOSIS — M79.671 BILATERAL FOOT PAIN: Primary | ICD-10-CM

## 2021-03-30 DIAGNOSIS — M79.672 BILATERAL FOOT PAIN: Primary | ICD-10-CM

## 2021-03-30 PROCEDURE — G8484 FLU IMMUNIZE NO ADMIN: HCPCS | Performed by: NURSE PRACTITIONER

## 2021-03-30 PROCEDURE — 99213 OFFICE O/P EST LOW 20 MIN: CPT | Performed by: NURSE PRACTITIONER

## 2021-03-30 PROCEDURE — G8417 CALC BMI ABV UP PARAM F/U: HCPCS | Performed by: NURSE PRACTITIONER

## 2021-03-30 PROCEDURE — G8427 DOCREV CUR MEDS BY ELIG CLIN: HCPCS | Performed by: NURSE PRACTITIONER

## 2021-03-30 PROCEDURE — 1036F TOBACCO NON-USER: CPT | Performed by: NURSE PRACTITIONER

## 2021-03-30 RX ORDER — DIPHENHYDRAMINE HCL 25 MG
25 CAPSULE ORAL EVERY 8 HOURS PRN
Qty: 20 CAPSULE | Refills: 0 | Status: SHIPPED | OUTPATIENT
Start: 2021-03-30 | End: 2022-04-20

## 2021-04-01 ASSESSMENT — ENCOUNTER SYMPTOMS
SHORTNESS OF BREATH: 0
DIARRHEA: 0
CONSTIPATION: 0
CHEST TIGHTNESS: 0
ABDOMINAL PAIN: 0

## 2021-08-11 ENCOUNTER — OFFICE VISIT (OUTPATIENT)
Dept: PRIMARY CARE CLINIC | Age: 26
End: 2021-08-11
Payer: COMMERCIAL

## 2021-08-11 VITALS
DIASTOLIC BLOOD PRESSURE: 76 MMHG | SYSTOLIC BLOOD PRESSURE: 115 MMHG | OXYGEN SATURATION: 98 % | TEMPERATURE: 97.7 F | WEIGHT: 195 LBS | BODY MASS INDEX: 30.61 KG/M2 | HEIGHT: 67 IN | HEART RATE: 72 BPM

## 2021-08-11 DIAGNOSIS — L98.9 SORE ON SCALP: ICD-10-CM

## 2021-08-11 DIAGNOSIS — Z13.29 SCREENING FOR THYROID DISORDER: ICD-10-CM

## 2021-08-11 DIAGNOSIS — Z01.84 IMMUNITY STATUS TESTING: ICD-10-CM

## 2021-08-11 DIAGNOSIS — G89.29 CHRONIC PAIN OF RIGHT KNEE: Primary | ICD-10-CM

## 2021-08-11 DIAGNOSIS — M25.561 CHRONIC PAIN OF RIGHT KNEE: Primary | ICD-10-CM

## 2021-08-11 DIAGNOSIS — Z13.0 SCREENING FOR DEFICIENCY ANEMIA: ICD-10-CM

## 2021-08-11 DIAGNOSIS — Z13.1 SCREENING FOR DIABETES MELLITUS: ICD-10-CM

## 2021-08-11 DIAGNOSIS — L29.9 SCALP PRURITUS: ICD-10-CM

## 2021-08-11 DIAGNOSIS — Z11.59 NEED FOR HEPATITIS C SCREENING TEST: ICD-10-CM

## 2021-08-11 PROCEDURE — 1036F TOBACCO NON-USER: CPT | Performed by: NURSE PRACTITIONER

## 2021-08-11 PROCEDURE — G8417 CALC BMI ABV UP PARAM F/U: HCPCS | Performed by: NURSE PRACTITIONER

## 2021-08-11 PROCEDURE — G8427 DOCREV CUR MEDS BY ELIG CLIN: HCPCS | Performed by: NURSE PRACTITIONER

## 2021-08-11 PROCEDURE — 99213 OFFICE O/P EST LOW 20 MIN: CPT | Performed by: NURSE PRACTITIONER

## 2021-08-11 SDOH — ECONOMIC STABILITY: FOOD INSECURITY: WITHIN THE PAST 12 MONTHS, THE FOOD YOU BOUGHT JUST DIDN'T LAST AND YOU DIDN'T HAVE MONEY TO GET MORE.: NEVER TRUE

## 2021-08-11 SDOH — ECONOMIC STABILITY: FOOD INSECURITY: WITHIN THE PAST 12 MONTHS, YOU WORRIED THAT YOUR FOOD WOULD RUN OUT BEFORE YOU GOT MONEY TO BUY MORE.: NEVER TRUE

## 2021-08-11 ASSESSMENT — ENCOUNTER SYMPTOMS
CONSTIPATION: 0
SHORTNESS OF BREATH: 0
ABDOMINAL PAIN: 0
DIARRHEA: 0
CHEST TIGHTNESS: 0

## 2021-08-11 ASSESSMENT — SOCIAL DETERMINANTS OF HEALTH (SDOH): HOW HARD IS IT FOR YOU TO PAY FOR THE VERY BASICS LIKE FOOD, HOUSING, MEDICAL CARE, AND HEATING?: NOT VERY HARD

## 2021-08-11 NOTE — PROGRESS NOTES
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 He has changed shoes and inserts. Pain persists. Reports dry, flaky scalp since 2019. Has tried head and shoulders, selsum blue and applying oil to scalp. Shampooing hair weekly. Reports sores in scalp. Itchy scalp. No chemicals in hair. Has been growing hair out since 2019. Review of Systems   Constitutional: Negative for activity change and fever. HENT: Negative for congestion. Eyes: Negative for visual disturbance. Respiratory: Negative for chest tightness and shortness of breath. Cardiovascular: Negative for chest pain, palpitations and leg swelling. Gastrointestinal: Negative for abdominal pain, constipation and diarrhea. Endocrine: Negative for polyuria. Genitourinary: Negative for dysuria. Musculoskeletal: Negative for arthralgias and myalgias. Chronic right knee pain   Skin: Negative for rash. Dandruff and dry scalp   Neurological: Negative for dizziness, light-headedness and headaches. Psychiatric/Behavioral: Negative for agitation, decreased concentration and sleep disturbance. The patient is not nervous/anxious.          Past Medical History:   Diagnosis Date    Acute stress disorder 11/13/2020    Current moderate episode of major depressive disorder without prior episode ([facility] [address] 11/12/2020    Herpes 02/2015    Low back pain 6/4/2015    Migraines     Neck pain      Past Surgical History:   Procedure Laterality Date    PAIN MANAGEMENT PROCEDURE N/A 12/10/2020    C7-T1 INTERLAMINAR EPIDURAL STEROID INJECTION WITH FLUOROSCOPY performed by José Olivo MD at [address] History   Problem Relation Age of Onset    No Known Problems Mother     No Known Problems Father      Social History     Tobacco Use    Smoking status: Never Smoker    Smokeless tobacco: Never Used   Vaping Use    Vaping Use: Never used   Substance Use Topics    Alcohol use: No    Drug use: No    height is 5' 7\" (1.702 m) and weight is 195 lb (88.5 kg). His temporal temperature is 97.7 °F (36.5 °C). His blood pressure is 115/76 and his pulse is 72. His oxygen saturation is 98%. Objective   Physical Exam  Vitals reviewed. Constitutional:       Appearance: He is well-developed. He is not diaphoretic. HENT:      Head: Normocephalic. Right Ear: Hearing and external ear normal. No tenderness. Left Ear: Hearing and external ear normal. No tenderness. Nose: Nose normal.   Eyes:      General: Lids are normal.   Cardiovascular:      Rate and Rhythm: Normal rate and regular rhythm. Heart sounds: Normal heart sounds, S1 normal and S2 normal.   Pulmonary:      Effort: Pulmonary effort is normal.      Breath sounds: Normal breath sounds. Musculoskeletal:         General: Normal range of motion. Left knee: No crepitus. Tenderness present over the lateral joint line. Comments: Tightness with straight leg lifts and bending 90 degrees, flexing   Lymphadenopathy:      Head:      Right side of head: No submental or submandibular adenopathy. Left side of head: No submental or submandibular adenopathy. Cervical:      Right cervical: No superficial cervical adenopathy. Left cervical: No superficial cervical adenopathy. Skin:     General: Skin is warm and dry. Findings: No rash. Nails: There is no clubbing. Comments: Dryness of scalp, some white flaking in hair. No sores, ulcerations or bumps noted. Neurological:      Mental Status: He is alert and oriented to person, place, and time. GCS: GCS eye subscore is 4. GCS verbal subscore is 5. GCS motor subscore is 6. Psychiatric:         Speech: Speech normal.         Behavior: Behavior normal. Behavior is cooperative.          Judgment: Judgment normal.            On this date 8/11/2021 I have spent 20 minutes reviewing previous notes, test results and face to face with the patient discussing the diagnosis and importance of compliance with the treatment plan as well as documenting on the day of the visit. An electronic signature was used to authenticate this note.     --MIKE Farnsworth - CNP

## 2021-08-11 NOTE — PATIENT INSTRUCTIONS
Patient Education        Knee Arthritis: Exercises  Introduction  Here are some examples of exercises for you to try. The exercises may be suggested for a condition or for rehabilitation. Start each exercise slowly. Ease off the exercises if you start to have pain. You will be told when to start these exercises and which ones will work best for you. How to do the exercises  Knee flexion with heel slide   1. Lie on your back with your knees bent. 2. Slide your heel back by bending your affected knee as far as you can. Then hook your other foot around your ankle to help pull your heel even farther back. 3. Hold for about 6 seconds, then rest for up to 10 seconds. 4. Repeat 8 to 12 times. 5. Switch legs and repeat steps 1 through 4, even if only one knee is sore. Quad sets   1. Sit with your affected leg straight and supported on the floor or a firm bed. Place a small, rolled-up towel under your knee. Your other leg should be bent, with that foot flat on the floor. 2. Tighten the thigh muscles of your affected leg by pressing the back of your knee down into the towel. 3. Hold for about 6 seconds, then rest for up to 10 seconds. 4. Repeat 8 to 12 times. 5. Switch legs and repeat steps 1 through 4, even if only one knee is sore. Straight-leg raises to the front   1. Lie on your back with your good knee bent so that your foot rests flat on the floor. Your affected leg should be straight. Make sure that your low back has a normal curve. You should be able to slip your hand in between the floor and the small of your back, with your palm touching the floor and your back touching the back of your hand. 2. Tighten the thigh muscles in your affected leg by pressing the back of your knee flat down to the floor. Hold your knee straight. 3. Keeping the thigh muscles tight and your leg straight, lift your affected leg up so that your heel is about 12 inches off the floor.  Hold for about 6 seconds, then lower slowly. 4. Relax for up to 10 seconds between repetitions. 5. Repeat 8 to 12 times. 6. Switch legs and repeat steps 1 through 5, even if only one knee is sore. Active knee flexion   1. Lie on your stomach with your knees straight. If your kneecap is uncomfortable, roll up a washcloth and put it under your leg just above your kneecap. 2. Lift the foot of your affected leg by bending the knee so that you bring the foot up toward your buttock. If this motion hurts, try it without bending your knee quite as far. This may help you avoid any painful motion. 3. Slowly move your leg up and down. 4. Repeat 8 to 12 times. 5. Switch legs and repeat steps 1 through 4, even if only one knee is sore. Quadriceps stretch (facedown)   1. Lie flat on your stomach, and rest your face on the floor. 2. Wrap a towel or belt strap around the lower part of your affected leg. Then use the towel or belt strap to slowly pull your heel toward your buttock until you feel a stretch. 3. Hold for about 15 to 30 seconds, then relax your leg against the towel or belt strap. 4. Repeat 2 to 4 times. 5. Switch legs and repeat steps 1 through 4, even if only one knee is sore. Stationary exercise bike   1. If you do not have a stationary exercise bike at home, you can find one to ride at your local health club or community center. 2. Adjust the height of the bike seat so that your knee is slightly bent when your leg is extended downward. If your knee hurts when the pedal reaches the top, you can raise the seat so that your knee does not bend as much. 3. Start slowly. At first, try to do 5 to 10 minutes of cycling with little to no resistance. Then increase your time and the resistance bit by bit until you can do 20 to 30 minutes without pain. 4. If you start to have pain, rest your knee until your pain gets back to the level that is normal for you. Or cycle for less time or with less effort.     Follow-up care is a key part of your treatment and safety. Be sure to make and go to all appointments, and call your doctor if you are having problems. It's also a good idea to know your test results and keep a list of the medicines you take. Where can you learn more? Go to https://chdarian.Sopogy. org and sign in to your Noteleaf account. Enter C159 in the Pulse [employer] box to learn more about \"Knee Arthritis: Exercises. \"     If you do not have an account, please click on the \"Sign Up Now\" link. Current as of: November 16, 2020               Content Version: 12.9  © 2006-2021 [employer]. Care instructions adapted under license by [facility] ([facility]). If you have questions about a medical condition or this instruction, always ask your healthcare professional. [address] any warranty or liability for your use of this information. Patient Education        Knee: Exercises  Introduction  Here are some examples of exercises for you to try. The exercises may be suggested for a condition or for rehabilitation. Start each exercise slowly. Ease off the exercises if you start to have pain. You will be told when to start these exercises and which ones will work best for you. How to do the exercises  Quad sets   1. Sit with your leg straight and supported on the floor or a firm bed. (If you feel discomfort in the front or back of your knee, place a small towel roll under your knee.)  2. Tighten the muscles on top of your thigh by pressing the back of your knee flat down to the floor. (If you feel discomfort under your kneecap, place a small towel roll under your knee.)  3. Hold for about 6 seconds, then rest for up to 10 seconds. 4. Do 8 to 12 repetitions several times a day. Straight-leg raises to the front   1. Lie on your back with your good knee bent so that your foot rests flat on the floor. Your injured leg should be straight. Make sure that your low back has a normal curve.  You should heels into the floor, squeeze your buttocks, and lift your hips off the floor until your shoulders, hips, and knees are all in a straight line. 3. Hold for about 6 seconds as you continue to breathe normally, and then slowly lower your hips back down to the floor and rest for up to 10 seconds. 4. Do 8 to 12 repetitions. Hamstring curls   1. Lie on your stomach with your knees straight. If your kneecap is uncomfortable, roll up a washcloth and put it under your leg just above your kneecap. 2. Lift the foot of your injured leg by bending the knee so that you bring the foot up toward your buttock. If this motion hurts, try it without bending your knee quite as far. This may help you avoid any painful motion. 3. Slowly lower your leg back to the floor. 4. Do 8 to 12 repetitions. 5. With permission from your doctor or physical therapist, you may also want to add a cuff weight to your ankle (not more than 5 pounds). With weight, you do not have to lift your leg more than 12 inches to get a hamstring workout. Shallow standing knee bends   Do this exercise only if you have very little pain; if you have no clicking, locking, or giving way if you have an injured knee; and if it does not hurt while you are doing 8 to 12 repetitions. 1. Stand with your hands lightly resting on a counter or chair in front of you. Put your feet shoulder-width apart. 2. Slowly bend your knees so that you squat down like you are going to sit in a chair. Make sure your knees do not go in front of your toes. 3. Lower yourself about 6 inches. Your heels should remain on the floor at all times. 4. Rise slowly to a standing position. Heel raises   1. Stand with your feet a few inches apart, with your hands lightly resting on a counter or chair in front of you. 2. Slowly raise your heels off the floor while keeping your knees straight. 3. Hold for about 6 seconds, then slowly lower your heels to the floor.   4. Do 8 to 12 repetitions several times during the day. Follow-up care is a key part of your treatment and safety. Be sure to make and go to all appointments, and call your doctor if you are having problems. It's also a good idea to know your test results and keep a list of the medicines you take. Where can you learn more? Go to https://chpepiceweb.deltaDNA. org and sign in to your Lagiar account. Enter K587 in the Act-On Software box to learn more about \"Knee: Exercises. \"     If you do not have an account, please click on the \"Sign Up Now\" link. Current as of: November 16, 2020               Content Version: 12.9  © 2006-2021 [employer]. Care instructions adapted under license by [facility] ([facility]). If you have questions about a medical condition or this instruction, always ask your healthcare professional. [place] [address] any warranty or liability for your use of this information. Patient Education        Lateral Collateral Ligament Sprain: Rehab Exercises  Introduction  Here are some examples of exercises for you to try. The exercises may be suggested for a condition or for rehabilitation. Start each exercise slowly. Ease off the exercises if you start to have pain. You will be told when to start these exercises and which ones will work best for you. How to do the exercises  Knee flexion with heel slide   1. Lie on your back with your knees bent. 2. Slide your heel back by bending your affected knee as far as you can. Then hook your other foot around your ankle to help pull your heel even farther back. 3. Hold for about 6 seconds, then rest for up to 10 seconds. 4. Repeat 8 to 12 times. Heel slides on a wall   1. Lie on the floor close enough to a wall so that you can place both legs up on the wall. Your hips should be as close to the wall as is comfortable for you. 2. Start with both feet resting on the wall.  Slowly let the foot of your affected leg slide down the wall until you feel a stretch in your knee. 3. Hold for 15 to 30 seconds. 4. Then slowly slide your foot up to where you started. 5. Repeat 2 to 4 times. Quad sets   1. Sit with your affected leg straight and supported on the floor or a firm bed. Place a small, rolled-up towel under your knee. Your other leg should be bent, with that foot flat on the floor. 2. Tighten the thigh muscles of your affected leg by pressing the back of your knee down into the towel. 3. Hold for about 6 seconds, then rest for up to 10 seconds. 4. Repeat 8 to 12 times. Short-arc quad   1. Lie on your back with your knees bent over a foam roll or a large rolled-up towel. 2. Lift the lower part of your affected leg and straighten your knee by tightening your thigh muscle. Keep the bottom of your knee on the foam roll or rolled-up towel. 3. Hold your knee straight for about 6 seconds, then slowly bend your knee and lower your leg back to the floor. Rest for up to 10 seconds between repetitions. 4. Repeat 8 to 12 times. Straight-leg raises to the front   1. Lie on your back with your good knee bent so that your foot rests flat on the floor. Your affected leg should be straight. Make sure that your low back has a normal curve. You should be able to slip your hand in between the floor and the small of your back, with your palm touching the floor and your back touching the back of your hand. 2. Tighten the thigh muscles in your affected leg by pressing the back of your knee flat down to the floor. Hold your knee straight. 3. Keeping the thigh muscles tight and your leg straight, lift your affected leg up so that your heel is about 12 inches off the floor. Hold for about 6 seconds, then lower slowly. 4. Relax for up to 10 seconds between repetitions. 5. Repeat 8 to 12 times. Hamstring set (heel dig)   1. Sit with your affected leg bent. Your good leg should be straight and supported on the floor.   2. Tighten the muscles on the back of your bent leg (hamstring) by pressing your heel into the floor. 3. Hold for about 6 seconds, then rest for up to 10 seconds. 4. Repeat 8 to 12 times. Hip adduction   You will need a pillow for this exercise. 1. Sit on the floor with your knees bent. 2. Place a pillow between your knees. 3. Put your hands slightly behind your hips for support. 4. Squeeze the pillow by tightening the muscles on the inside of your thighs. 5. Hold for 6 seconds, then rest for up to 10 seconds. 6. Repeat 8 to 12 times. Hip abduction   You will need a small pillow for this exercise. 1. Sit on the floor with your affected knee close to a wall. 2. Bend your affected knee but keep the other leg straight in front of you. 3. Place a pillow between the outside of your knee and the wall. 4. Put your hands slightly behind your hips for support. 5. Push the outside of your knee against the pillow and the wall. 6. Hold for 6 seconds, then rest for up to 10 seconds. 7. Repeat 8 to 12 times. Lateral step-up   1. Stand sideways on the bottom step of a staircase with your injured leg on the step and your other foot on the floor. Hold on to the banister or wall. 2. Use your injured leg to raise yourself up, bringing your other foot level with the stair step. Make sure to keep your hips level as you do this. And try to keep your knee moving in a straight line with your middle toe. Do not put the foot you are raising on the stair step. 3. Slowly lower your foot back down. 4. Repeat 8 to 12 times. Wall squats with ball   You will need a large therapy ball for this exercise. Ask your doctor or physical therapist what size you will need, but it should be large enough to cover your back. 1. Stand with your back facing a wall. Place your feet about a shoulder-width apart.   2. Place the therapy ball between your back and the wall, and move your feet out in front of you so they are about a foot in front of your hips.  3. Keep your arms at your sides, or put your hands on your hips. 4. Slowly squat down as if you are going to sit in a chair, rolling your back over the ball as you squat. The ball should move with you but stay pressed into the wall. 5. Be sure that your knees do not go in front of your toes as you squat. 6. Hold for 6 seconds. 7. Slowly rise to your standing position. 8. Repeat 8 to 12 times. Follow-up care is a key part of your treatment and safety. Be sure to make and go to all appointments, and call your doctor if you are having problems. It's also a good idea to know your test results and keep a list of the medicines you take. Where can you learn more? Go to https://Hammerhead [email or web address].SocialPicks. org and sign in to your Urban Ladder account. Enter A255 in the imo.im box to learn more about \"Lateral Collateral Ligament Sprain: Rehab Exercises. \"     If you do not have an account, please click on the \"Sign Up Now\" link. Current as of: November 16, 2020               Content Version: 12.9  © 3143-6603 [employer], [employer]orporated. Care instructions adapted under license by [facility] ([facility]). If you have questions about a medical condition or this instruction, always ask your healthcare professional. [address] any warranty or liability for your use of this information.        Perform knee exercises provided  Schedule appointment with Orthopedic Specialist  Have labs drawn

## 2021-08-23 ENCOUNTER — OFFICE VISIT (OUTPATIENT)
Dept: ORTHOPEDIC SURGERY | Age: 26
End: 2021-08-23
Payer: COMMERCIAL

## 2021-08-23 VITALS — BODY MASS INDEX: 30.61 KG/M2 | WEIGHT: 195 LBS | RESPIRATION RATE: 16 BRPM | HEIGHT: 67 IN

## 2021-08-23 DIAGNOSIS — M25.562 CHRONIC PAIN OF LEFT KNEE: Primary | ICD-10-CM

## 2021-08-23 DIAGNOSIS — G89.29 CHRONIC PAIN OF LEFT KNEE: Primary | ICD-10-CM

## 2021-08-23 PROCEDURE — 99243 OFF/OP CNSLTJ NEW/EST LOW 30: CPT | Performed by: ORTHOPAEDIC SURGERY

## 2021-08-23 PROCEDURE — G8417 CALC BMI ABV UP PARAM F/U: HCPCS | Performed by: ORTHOPAEDIC SURGERY

## 2021-08-23 PROCEDURE — G8427 DOCREV CUR MEDS BY ELIG CLIN: HCPCS | Performed by: ORTHOPAEDIC SURGERY

## 2021-08-23 RX ORDER — ANASTROZOLE 1 MG/1
TABLET ORAL
COMMUNITY
Start: 2021-07-06 | End: 2022-04-20 | Stop reason: ALTCHOICE

## 2021-08-24 ENCOUNTER — TELEPHONE (OUTPATIENT)
Dept: ORTHOPEDIC SURGERY | Age: 26
End: 2021-08-24

## 2021-08-24 NOTE — TELEPHONE ENCOUNTER
S/W PATIENT regarding MRI LT KNEE approval and authorization being valid until 10/22/2021. Patient was instructed that their MRI has been scheduled at [facility] for 9/1/2021 at 7:30am with an arrival time of 6:45-7:00am. The patient was instructed to contact the facility if there is a need to reschedule at [phone]. A follow up was scheduled with the patient for 9/7 at the [facility] office. The patient was provided contact information should the need arise to reschedule any of the appointments.

## 2021-09-01 ENCOUNTER — HOSPITAL ENCOUNTER (OUTPATIENT)
Dept: MRI IMAGING | Age: 26
Discharge: HOME | End: 2021-09-01
Payer: COMMERCIAL

## 2021-09-01 DIAGNOSIS — G89.29 CHRONIC PAIN OF LEFT KNEE: ICD-10-CM

## 2021-09-01 DIAGNOSIS — M25.562 CHRONIC PAIN OF LEFT KNEE: ICD-10-CM

## 2021-09-01 PROCEDURE — 73721 MRI JNT OF LWR EXTRE W/O DYE: CPT

## 2021-09-16 ENCOUNTER — OFFICE VISIT (OUTPATIENT)
Dept: ORTHOPEDIC SURGERY | Age: 26
End: 2021-09-16
Payer: COMMERCIAL

## 2021-09-16 VITALS — HEIGHT: 67 IN | WEIGHT: 197 LBS | BODY MASS INDEX: 30.92 KG/M2

## 2021-09-16 DIAGNOSIS — G89.29 CHRONIC PAIN OF LEFT KNEE: Primary | ICD-10-CM

## 2021-09-16 DIAGNOSIS — M25.562 CHRONIC PAIN OF LEFT KNEE: Primary | ICD-10-CM

## 2021-09-16 PROCEDURE — G8417 CALC BMI ABV UP PARAM F/U: HCPCS | Performed by: ORTHOPAEDIC SURGERY

## 2021-09-16 PROCEDURE — 99213 OFFICE O/P EST LOW 20 MIN: CPT | Performed by: ORTHOPAEDIC SURGERY

## 2021-09-16 PROCEDURE — G8427 DOCREV CUR MEDS BY ELIG CLIN: HCPCS | Performed by: ORTHOPAEDIC SURGERY

## 2021-09-16 PROCEDURE — 1036F TOBACCO NON-USER: CPT | Performed by: ORTHOPAEDIC SURGERY

## 2021-09-16 NOTE — PROGRESS NOTES
CHIEF COMPLAINT: Left knee pain. History:   Jamal Pacheco is a 30 y.o. male here for left knee follow-up. Initial history: referred by MIKE Meek CNP for Sports Medicine consultation for evaluation and treatment of left knee pain / injury. The patient complains of left knee pain. This is evaluated as a personal injury. The pain began greater than 1 year ago. Rate pain 7/10. There was not a history of injury. The pain is located lateral.  Pain is worse with getting up from sitting, knee flexion, stairs. The knee has given out or felt unstable. The patient can bend and straighten the knee fully. There is swelling in the knee. There was catching / locking of the knee. The patient has not had PT. The patient has not had an injection. The patient has taken NSAIDs. The patient has tried ice. 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 Interval History: His knee feels the same. He rates pain 7/10.         Past Medical History:   Diagnosis Date    Acute stress disorder 11/13/2020    Current moderate episode of major depressive disorder without prior episode ([facility] [address] 11/12/2020    Herpes 02/2015    Low back pain 6/4/2015    Migraines     Neck pain        Past Surgical History:   Procedure Laterality Date    PAIN MANAGEMENT PROCEDURE N/A 12/10/2020    C7-T1 INTERLAMINAR EPIDURAL STEROID INJECTION WITH FLUOROSCOPY performed by Shira Pizano MD at [place] [address] History   Problem Relation Age of Onset    No Known Problems Mother     No Known Problems Father        Social History     Socioeconomic History    Marital status: Single     Spouse name: None    Number of children: None    Years of education: None    Highest education level: None   Occupational History    Occupation:     Occupation: OFFICE     Employer: [employer]   Tobacco Use    Smoking status: Never Smoker    Smokeless tobacco: Never Used   Vaping Use    Vaping Use: Never used   Substance and Sexual Activity    Alcohol use: No    Drug use: No    Sexual activity: Yes     Partners: Female   Other Topics Concern    None   Social History Narrative    Lives with significant other and son     Social Determinants of Health     Financial Resource Strain: Low Risk     Difficulty of Paying Living Expenses: Not very hard   Food Insecurity: No Food Insecurity    Worried About Running Out of Food in the Last Year: Never true    Joie of Food in the Last Year: Never true   Transportation Needs:     Lack of Transportation (Medical):  Lack of Transportation (Non-Medical):    Physical Activity:     Days of Exercise per Week:     Minutes of Exercise per Session:    Stress:     Feeling of Stress :    Social Connections:     Frequency of Communication with Friends and Family:     Frequency of Social Gatherings with Friends and Family:     Attends Nondenominational Services:     Active Member of Clubs or Organizations:     Attends Club or Organization Meetings:     Marital Status:    Intimate Partner Violence:     Fear of Current or Ex-Partner:     Emotionally Abused:     Physically Abused:     Sexually Abused:        Current Outpatient Medications   Medication Sig Dispense Refill    anastrozole (ARIMIDEX) 1 MG tablet TAKE 1 TABLET BY MOUTH EVERY OTHER DAY      clomiPHENE (CLOMID) 50 MG tablet TAKE 1/2 TABLET BY MOUTH EVERY OTHER DAY      diphenhydrAMINE (BENADRYL) 25 MG capsule Take 1 capsule by mouth every 8 hours as needed for Itching 20 capsule 0    EPINEPHrine (EPIPEN 2-MANNIE) 0.3 MG/0.3ML SOAJ injection Use as directed for allergic reaction (Patient not taking: Reported on 9/16/2021) 1 each 0     No current facility-administered medications for this visit.        Allergies   Allergen Reactions    Lactose Anaphylaxis       Review of Systems:  I have reviewed the clinically relevant past medical history, medications, allergies, family history, social history, and 13 point Review of Systems from the patient's recent history form & documented any details relevant to today's presenting complaints in the history above. The patient's self-reported past medical history, medications, allergies, family history, social history, and Review of Systems form from 8/23/21 have been scanned into the chart under the \"Media\" tab. Physical Examination:     Vital signs:   Ht 5' 7\" (1.702 m)   Wt 197 lb (89.4 kg)   BMI 30.85 kg/m²     General:  alert, appears stated age, cooperative and no distress       Imaging:  Left Knee X-Ray 8/23/21: No fracture or dislocation. Normal joint spaces. Left Knee MRI: I independently reviewed the images, as well as the radiology report. 1. No acute ligamentous or meniscal injury. 2. No acute osseous abnormality. Assessment:     Left knee pain  Depression      Plan:     Natural history and expected course discussed. Questions answered. We discussed that the MRI demonstrated no structural abnormality. Ice as needed. NSAIDs as needed. PT referral provided. We did discuss that the etiology is his knee pain, may not actually be structural but could be inflammatory. He then said that he does feel like [place] old man\" and has pain all around his body. For thoroughness, if symptoms do not improve, we could consider rheumatologic work-up. Follow-up in 2 months as needed. Iraida Suazo. Mariya Bass MD  Orthopaedic Surgery and Sports Medicine     Disclaimer: This note was generated with use of a verbal recognition program (DRAGON) and an attempt was made to check for errors. It is possible that there are still dictated errors within this office note. If so, please bring any significant errors to my attention for an addendum. All efforts were made to ensure that this office note is accurate.

## 2021-10-18 ENCOUNTER — HOSPITAL ENCOUNTER (OUTPATIENT)
Dept: PHYSICAL THERAPY | Age: 26
Setting detail: THERAPIES SERIES
Discharge: HOME | End: 2021-10-18
Payer: COMMERCIAL

## 2021-10-18 NOTE — FLOWSHEET NOTE
[facility], [facility]  [address] [facility] [facility] [place], [facility]  Phone: [phone]   Fax:     [phone]    Physical Therapy  Cancellation/No-show Note  Patient Name:  Chelsi Starr  :  1995   Date:  10/18/2021  Cancelled visits to date: 0  No-shows to date: 1    Patient status for today's appointment patient:  []  Cancelled  []  Rescheduled appointment  [x]  No-show for initial evaluation     Reason given by patient:   []  Patient ill  []  Conflicting appointment  []  No transportation    []  Conflict with work  []  No reason given  []  Other:     Comments:      Phone call information:   []  Phone call made today to patient at _ time at number provided:      []  Patient answered, conversation as follows:    []  Patient did not answer, message left as follows:  []  Phone call not made today    Electronically signed by:  Alirio Knight , OMT-C,  [id number]

## 2021-11-16 ENCOUNTER — HOSPITAL ENCOUNTER (EMERGENCY)
Age: 26
Discharge: HOME | End: 2021-11-16
Payer: COMMERCIAL

## 2021-11-16 VITALS
HEART RATE: 95 BPM | TEMPERATURE: 99 F | RESPIRATION RATE: 16 BRPM | HEIGHT: 68 IN | DIASTOLIC BLOOD PRESSURE: 72 MMHG | SYSTOLIC BLOOD PRESSURE: 115 MMHG | WEIGHT: 196.21 LBS | BODY MASS INDEX: 29.74 KG/M2 | OXYGEN SATURATION: 98 %

## 2021-11-16 DIAGNOSIS — Z11.3 SCREEN FOR STD (SEXUALLY TRANSMITTED DISEASE): ICD-10-CM

## 2021-11-16 DIAGNOSIS — L29.3 PENILE PRURITUS: Primary | ICD-10-CM

## 2021-11-16 LAB — URINE TRICHOMONAS EVALUATION: NORMAL

## 2021-11-16 PROCEDURE — 99283 EMERGENCY DEPT VISIT LOW MDM: CPT

## 2021-11-16 PROCEDURE — 87491 CHLMYD TRACH DNA AMP PROBE: CPT

## 2021-11-16 PROCEDURE — 81015 MICROSCOPIC EXAM OF URINE: CPT

## 2021-11-16 PROCEDURE — 87591 N.GONORRHOEAE DNA AMP PROB: CPT

## 2021-11-16 ASSESSMENT — ENCOUNTER SYMPTOMS
EYE REDNESS: 0
APNEA: 0
VOMITING: 0
FACIAL SWELLING: 0
CHOKING: 0
SHORTNESS OF BREATH: 0
BACK PAIN: 0
ABDOMINAL PAIN: 0
EYE DISCHARGE: 0
NAUSEA: 0

## 2021-11-16 NOTE — ED PROVIDER NOTES
**ADVANCED PRACTICE PROVIDER, I HAVE EVALUATED THIS PATIENT**        [address] ENCOUNTER      Pt Name: Jose Dash  BDI:[id number]  [place]iolet[place] 1995  Date of evaluation: 11/16/2021  Provider: Chloé Munoz PA-C      Chief Complaint:    Chief Complaint   Patient presents with    Exposure to STD     penile itching x2days         Nursing Notes, Past Medical Hx, Past Surgical Hx, Social Hx, Allergies, and Family Hx were all reviewed and agreed with or any disagreements were addressed in the HPI.    HPI: (Location, Duration, Timing, Severity, Quality, Assoc Sx, Context, Modifying factors)    This is a  27 y.o. male who presents to emergency room with complaint of penile itching at a in the urination said he is itching. Denies rash. Concern for possible STD wants to be treated for an STD. Denies penile discharge. Laurance Haver he has been sleeping with the same girlfriend. They have been using protection and Saturday he had no protection and then he started itching. Denies scrotal pain or rash. No testicular pain. Denies blood in his urine. No abdominal pain, no fever. No nausea vomiting. No other complaints.       PastMedical/Surgical History:      Diagnosis Date    Acute stress disorder 11/13/2020    Current moderate episode of major depressive disorder without prior episode ([facility] [address] 11/12/2020    Herpes 02/2015    Low back pain 6/4/2015    Migraines     Neck pain          Procedure Laterality Date    PAIN MANAGEMENT PROCEDURE N/A 12/10/2020    C7-T1 INTERLAMINAR EPIDURAL STEROID INJECTION WITH FLUOROSCOPY performed by Andrzej Che MD at [facility]       Medications:  Previous Medications    ANASTROZOLE (ARIMIDEX) 1 MG TABLET    TAKE 1 TABLET BY MOUTH EVERY OTHER DAY    CLOMIPHENE (CLOMID) 50 MG TABLET    TAKE 1/2 TABLET BY MOUTH EVERY OTHER DAY    DIPHENHYDRAMINE (BENADRYL) 25 MG CAPSULE    Take 1 capsule by mouth every 8 hours as needed for 3.4 oz (89 kg)   Height: 5' 7.5\" (1.715 m)       LABS:  Labs Reviewed   C.TRACHOMATIS N.GONORRHOEAE DNA, URINE   URINE TRICHOMONAS EVALUATION    Narrative:     Performed at:  [facility]) [facility]  [address] [place], [facility]   Phone [phone] of labs reviewed and were negative at this time or not returned at the time of this note. RADIOLOGY:   Non-plain film images such as CT, Ultrasound and MRI are read by the radiologist. Trini Ochoa PA-C have directly visualized the radiologic plain film image(s) with the below findings:      Interpretation per the Radiologist below, if available at the time of this note:    No orders to display        No results found. MEDICAL DECISION MAKING / ED COURSE:      PROCEDURES:   Procedures    None    Patient was given:  Medications - No data to display      Emergency room course: Patient on exam cardiovascular regular rhythm, lungs are clear. No wheeze rales or rhonchi noted. Abdomen is soft nontender. Inguinal areas shows no adenopathy. Penis show no rash. No discharge noted. Testicle scrotum show no swelling nontender. No erythema. I discussed with patient that we will treat what we can test for today next the trichomonas. Gonorrhea chlamydia takes a few days to come back. And we will treat him appropriately when we get the results. Since he is not having any other symptoms but rather wait to really get the results before we treat him for something he may not have. He was okay with this plan. We will get trichomonas results back today. Urine trichomonas negative. Patient was instructed to take OTC or use OTC hydrocortisone as needed for any itching. Return for any worsening. He will be notified if his gonorrhea chlamydia results are positive and treated appropriately at that time. He was okay with this plan. He will be discharged stable condition.         The patient tolerated their visit well. I evaluated the patient. The physician was available for consultation as needed. The patient and / or the family were informed of the results of any tests, a time was given to answer questions, a plan was proposed and they agreed with plan. CLINICAL IMPRESSION:  1. Penile pruritus    2.  Screen for STD (sexually transmitted disease)        DISPOSITION Decision To Discharge 11/16/2021 03:33:12 PM      PATIENT REFERRED TO:  MIKE Cunningham - CNP  [address]  [phone]    Call   As needed      DISCHARGE MEDICATIONS:  New Prescriptions    No medications on file       DISCONTINUED MEDICATIONS:  Discontinued Medications    No medications on file              (Please note the MDM and HPI sections of this note were completed with a voice recognition program.  Efforts were made to edit the dictations but occasionally words are mis-transcribed.)    Electronically signed, Jesica Denis PA-C,          Jesica Denis PA-C  11/16/21 2398

## 2021-11-17 LAB
C. TRACHOMATIS DNA ,URINE: NEGATIVE
N. GONORRHOEAE DNA, URINE: NEGATIVE

## 2021-11-18 ENCOUNTER — HOSPITAL ENCOUNTER (EMERGENCY)
Age: 26
Discharge: OTHER INSTITUTION | End: 2021-11-18
Payer: COMMERCIAL

## 2021-11-18 VITALS
TEMPERATURE: 98.1 F | OXYGEN SATURATION: 98 % | HEIGHT: 66 IN | BODY MASS INDEX: 30.79 KG/M2 | SYSTOLIC BLOOD PRESSURE: 122 MMHG | RESPIRATION RATE: 16 BRPM | HEART RATE: 78 BPM | WEIGHT: 191.58 LBS | DIASTOLIC BLOOD PRESSURE: 74 MMHG

## 2021-11-18 DIAGNOSIS — N39.0 URINARY TRACT INFECTION WITHOUT HEMATURIA, SITE UNSPECIFIED: Primary | ICD-10-CM

## 2021-11-18 LAB
BILIRUBIN URINE: NEGATIVE
BLOOD, URINE: NEGATIVE
CLARITY: CLEAR
COLOR: YELLOW
EPITHELIAL CELLS, UA: 3 /HPF (ref 0–5)
GLUCOSE URINE: NEGATIVE MG/DL
HYALINE CASTS: 16 /LPF (ref 0–8)
KETONES, URINE: ABNORMAL MG/DL
LEUKOCYTE ESTERASE, URINE: ABNORMAL
MICROSCOPIC EXAMINATION: YES
NITRITE, URINE: NEGATIVE
PH UA: 6 (ref 5–8)
PROTEIN UA: NEGATIVE MG/DL
RBC UA: 3 /HPF (ref 0–4)
SPECIFIC GRAVITY UA: 1.03 (ref 1–1.03)
SPECIMEN TYPE: NORMAL
TRICHOMONAS VAGINALIS SCREEN: NEGATIVE
URINE REFLEX TO CULTURE: YES
URINE TYPE: ABNORMAL
UROBILINOGEN, URINE: 1 E.U./DL
WBC UA: 28 /HPF (ref 0–5)

## 2021-11-18 PROCEDURE — 81001 URINALYSIS AUTO W/SCOPE: CPT

## 2021-11-18 PROCEDURE — 99283 EMERGENCY DEPT VISIT LOW MDM: CPT

## 2021-11-18 PROCEDURE — 87086 URINE CULTURE/COLONY COUNT: CPT

## 2021-11-18 PROCEDURE — 87808 TRICHOMONAS ASSAY W/OPTIC: CPT

## 2021-11-18 RX ORDER — CEPHALEXIN 500 MG/1
500 CAPSULE ORAL 4 TIMES DAILY
Qty: 28 CAPSULE | Refills: 0 | Status: SHIPPED | OUTPATIENT
Start: 2021-11-18 | End: 2021-11-25

## 2021-11-18 ASSESSMENT — PAIN SCALES - GENERAL: PAINLEVEL_OUTOF10: 0

## 2021-11-18 NOTE — ED PROVIDER NOTES
[address] [facility]  [address] [address]  Dept: [phone]  Loc: [address] ENCOUNTER        This patient was not seen or evaluated by the attending physician. I evaluated this patient, the attending physician was available for consultation. CHIEF COMPLAINT    Chief Complaint   Patient presents with    Exposure to STD     Burning with urination since Sunday. Denies discharge. Admits to frequency in urination. RENEE Starr is a 27 y.o. male who presents with concern for possible STD exposure, with associated burning with urination. Onset was Sunday. The duration has been constant, only occurring when he urinates. There are no aggravating or alleviating factors. He denies abdominal pain, nausea or vomiting. He denies discharge, testicular pain or swelling. He has no fever or chills. He was seen just a few days ago, and tested, but declined treatment pending culture results. He has no further complaints at this time.       REVIEW OF SYSTEMS    General: No fevers  : with dysuria, no  discharge  GI: No vomiting  Skin: No new rashes  Musculoskeletal: No arthralgia    PAST MEDICAL & SURGICAL HISTORY    Past Medical History:   Diagnosis Date    Acute stress disorder 11/13/2020    Current moderate episode of major depressive disorder without prior episode ([facility] [address] 11/12/2020    Herpes 02/2015    Low back pain 6/4/2015    Migraines     Neck pain      Past Surgical History:   Procedure Laterality Date    PAIN MANAGEMENT PROCEDURE N/A 12/10/2020    C7-T1 INTERLAMINAR EPIDURAL STEROID INJECTION WITH FLUOROSCOPY performed by Gerri Lamb MD at [address] [place] [address]  (may include discharge medications prescribed in the ED)  Current Outpatient Rx   Medication Sig Dispense Refill    cephALEXin (KEFLEX) 500 MG capsule Take 1 capsule by mouth 4 times daily for 7 days 28 capsule 0    anastrozole (ARIMIDEX) 1 MG tablet TAKE 1 TABLET BY MOUTH EVERY OTHER DAY      clomiPHENE (CLOMID) 50 MG tablet TAKE 1/2 TABLET BY MOUTH EVERY OTHER DAY      diphenhydrAMINE (BENADRYL) 25 MG capsule Take 1 capsule by mouth every 8 hours as needed for Itching 20 capsule 0    EPINEPHrine (EPIPEN 2-MANNIE) 0.3 MG/0.3ML SOAJ injection Use as directed for allergic reaction (Patient not taking: Reported on 9/16/2021) 1 each 0       ALLERGIES    Allergies   Allergen Reactions    Lactose Anaphylaxis       FAMILY AND SOCIAL HISTORY    Family History   Problem Relation Age of Onset    No Known Problems Mother     No Known Problems Father      Social History     Socioeconomic History    Marital status: Single     Spouse name: None    Number of children: None    Years of education: None    Highest education level: None   Occupational History    Occupation:     Occupation: OFFICE     Employer: [employer]   Tobacco Use    Smoking status: Never Smoker    Smokeless tobacco: Never Used   Vaping Use    Vaping Use: Never used   Substance and Sexual Activity    Alcohol use: No    Drug use: No    Sexual activity: Yes     Partners: Female   Other Topics Concern    None   Social History Narrative    Lives with significant other and son     Social Determinants of Health     Financial Resource Strain: Low Risk     Difficulty of Paying Living Expenses: Not very hard   Food Insecurity: No Food Insecurity    Worried About Running Out of Food in the Last Year: Never true    Joie of Food in the Last Year: Never true   Transportation Needs:     Lack of Transportation (Medical): Not on file    Lack of Transportation (Non-Medical):  Not on file   Physical Activity:     Days of Exercise per Week: Not on file    Minutes of Exercise per Session: Not on file   Stress:     Feeling of Stress : Not on file   Social Connections:     Frequency of Communication with Friends and Family: Not on file    Frequency of Social Gatherings with Friends and Family: Not on file    Attends Pent[employer]al Services: Not on file    Active Member of Clubs or Organizations: Not on file    Attends Club or Organization Meetings: Not on file    Marital Status: Not on file   Intimate Partner Violence:     Fear of Current or Ex-Partner: Not on file    Emotionally Abused: Not on file    Physically Abused: Not on file    Sexually Abused: Not on file   Housing Stability:     Unable to Pay for Housing in the Last Year: Not on file    Number of [place] in the Last Year: Not on file    Unstable Housing in the Last Year: Not on file       PHYSICAL EXAM    VITAL SIGNS: /74   Pulse 78   Temp 98.1 °F (36.7 °C) (Oral)   Resp 16   Ht 5' 6\" (1.676 m)   Wt 191 lb 9.3 oz (86.9 kg)   SpO2 98%   BMI 30.92 kg/m²   Constitutional:  Well-developed, appears comfortable  Eyes:  Non-icteric sclera, no conjunctival injection   HENT:  Atraumatic, external nose normal  Respiratory:  No respiratory distress  Cardiovascular:  No JVD  GI:  Abdomen is non-distended, no abdominal tenderness, no CVA tenderness. : Patient declined genital exam  Integument:  Nondiaphoretic skin, no rashes on exposed surfaces  Musculoskeletal: No obvious joint swelling or limitations of joints range of motion  Neurologic: Awake and oriented, no slurred speech    ED COURSE & MEDICAL DECISION MAKING    See chart for details of medications given. Differential diagnosis: UTI, Pyelonephritis, Chlamydia/Gonorrhea, Bacterial Vaginosis, Yeast vaginitis, Trichomonas, Herpes genitalia, Venereal Warts (condyloma acuminata), Syphilis, HIV/AIDS, Chancroid (Haemophilus ducreyi), Lymphogranuloma venereum, Granuloma inguinale    Patient is afebrile and nontoxic in appearance. Urinalysis with WBC present, negative for Trichomonas. He was not treated in the ED with Rocephin and doxycycline - as the patient had a negative gonorrhea/chlamydia on 11/16.       I instructed the patient to follow up as an outpatient in 2 days. I instructed the patient to have an outpatient HIV and Syphilis test, to be ordered by the follow-up doctor or at a local clinic. I will provide a list of local clinics with the discharge instructions. I instructed the patient not to have sex for 2 weeks. I instructed the patient to return to the ED immediately for any new or worsening symptoms. The patient verbalizes understanding. FINAL IMPRESSION    1.  Urinary tract infection without hematuria, site unspecified        PLAN  Discharge with outpatient follow-up     (Please note that this note was completed with a voice recognition program.  Every attempt was made to edit the dictations, but inevitably there remain words that are mis-transcribed.)     Omar Chesterma  11/18/21 [id number]

## 2021-11-18 NOTE — ED TRIAGE NOTES
Patient to ED c/o burning with urination since Sunday. Denies discharge. Admits to frequency in urination. Patient believes he has been exposed to STD.

## 2021-11-19 LAB — URINE CULTURE, ROUTINE: NORMAL

## 2022-04-19 NOTE — PROGRESS NOTES
Mary Lou Calvo (:  1995) is a 27 y.o. black male,Established patient with a history of Migraines, knee pain. Here for evaluation of the following chief complaint(s):  Follow-up and Forms (FMLA)         ASSESSMENT/PLAN:  1. Migraine without aura and without status migrainosus, not intractable- since teenager, pain occurs behind right eye and is gradual, denies visual disturbance or aura. Occurs  2-3 times/week relieved with Excedrin. Twice monthly only relieved with bedrest. Negative head CT . No previous Neurology evaluation.  -continue Excedrin as needed  -Provided samples of Ubrelvy and co-pay card. Patient will notify Provider of effectiveness.   -Will write prescription at patient request once new insurance begins  -Will complete FMLA    No follow-ups on file. Subjective    Mr. Bautista Iqbal is without complaints today. 11 month old son  2020, therapy with Dr. Estrella Krause in the past was effective. Has had 1 child and taking fertility medication. He would like to have [place] paperwork completed regarding Migraines. 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 Has chronic left knee pain, evaluated by Orthopedics, no abnormalities from MRI 2021. SUBJECTIVE/OBJECTIVE:  Migraine   This is a chronic problem. The current episode started more than 1 year ago. The problem occurs intermittently. The pain is located in the right unilateral region. The pain quality is similar to prior headaches. The quality of the pain is described as throbbing. The pain is moderate. Associated symptoms include dizziness. Pertinent negatives include no abnormal behavior, back pain, blurred vision, eye redness, hearing loss, rhinorrhea, scalp tenderness, seizures, sinus pressure, swollen glands, tinnitus, visual change, vomiting or weakness. The symptoms are aggravated by activity and bright light.  He has tried Excedrin for the symptoms. The treatment provided significant relief. His past medical history is significant for obesity. Started with migraines as a teenager. Has taken Excedrin which is usually effective. Has tried tylenol, Aleve. Last week \"felt like someone was kicking me with a steel toe boot. \" Was off work for 2 days, laid in bed. Excedrin was not effective. Migraines occurs 3-4 times weekly, twice a month the migraine is severe and has to remain in bed. Head CT obtained due to headache in 2018, no abnormalities. Has not had evaluation by Neurology. Takes Allegra daily for allergic rhinitis. Works different shifts and drives different routes with [employer], enjoys job and has benefits. Knee Pain   The incident occurred more than 1 week ago (started 2 years ago). There was no injury mechanism. The pain is present in the left knee. The quality of the pain is described as aching. The pain is moderate. The pain has been fluctuating since onset. He reports no foreign bodies present. The symptoms are aggravated by palpation and weight bearing. He has tried NSAIDs for the symptoms. The treatment provided mild relief. States has had this pain for over 2 years. Pain increased after carrying a box up several stairs. Pain is aggravated by climbing stairs. Denies pain with exercising on treadmill or exercise bicycle. Denies swelling, redness,  recent injury or fall. Performs stretching exercises in the morning and before bed. 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 He has changed shoes and inserts. Pain persists.      Reports dry, flaky scalp since 2019. Has tried head and shoulders, selsum blue and applying oil to scalp. Shampooing hair weekly. Reports sores in scalp. Itchy scalp. No chemicals in hair. Has been growing hair out since 2019.   Review of Systems   HENT: Negative for hearing loss, rhinorrhea, sinus pressure and tinnitus. Eyes: Negative for blurred vision and redness. Respiratory: Negative for chest tightness, shortness of breath and wheezing. Cardiovascular: Negative for chest pain, palpitations and leg swelling. Gastrointestinal: Negative for vomiting. Genitourinary: Negative for difficulty urinating. Musculoskeletal: Negative for back pain. Allergic/Immunologic:        Allergic rhinitis   Neurological: Positive for dizziness and headaches. Negative for seizures, weakness and light-headedness. Migraines          Objective     height is 5' 6\" (1.676 m) and weight is 185 lb (83.9 kg). His temperature is 97.5 °F (36.4 °C). His blood pressure is 114/69 and his pulse is 63. His oxygen saturation is 98%. MRI LEFT KNEE 9/1/2021  1. No acute ligamentous or meniscal injury. 2. No acute osseous abnormality. HEAD CT WO CONTRAST 8/29/2018  No acute intracranial abnormality. Physical Exam  Vitals reviewed. Constitutional:       Appearance: Normal appearance. He is well-groomed. HENT:      Head: Normocephalic. Right Ear: Hearing normal.      Left Ear: Hearing normal.   Eyes:      General: Lids are normal. Vision grossly intact. Conjunctiva/sclera: Conjunctivae normal.   Pulmonary:      Effort: Pulmonary effort is normal.   Neurological:      General: No focal deficit present. Mental Status: He is alert and oriented to person, place, and time. GCS: GCS eye subscore is 4. GCS verbal subscore is 5. GCS motor subscore is 6. Coordination: Coordination is intact. Gait: Gait is intact. Psychiatric:         Attention and Perception: Attention normal.         Mood and Affect: Mood normal.         Speech: Speech normal.         Behavior: Behavior normal. Behavior is cooperative.             On this date 4/20/2022 I have spent 15 minutes reviewing previous notes, test results and face to face with the patient discussing the diagnosis and importance of compliance with the treatment plan as well as documenting on the day of the visit. An electronic signature was used to authenticate this note.     --MIKE Holland - CNP

## 2022-04-20 ENCOUNTER — OFFICE VISIT (OUTPATIENT)
Dept: PRIMARY CARE CLINIC | Age: 27
End: 2022-04-20
Payer: COMMERCIAL

## 2022-04-20 VITALS
WEIGHT: 185 LBS | BODY MASS INDEX: 29.73 KG/M2 | DIASTOLIC BLOOD PRESSURE: 69 MMHG | HEIGHT: 66 IN | SYSTOLIC BLOOD PRESSURE: 114 MMHG | TEMPERATURE: 97.5 F | OXYGEN SATURATION: 98 % | HEART RATE: 63 BPM

## 2022-04-20 DIAGNOSIS — G43.009 MIGRAINE WITHOUT AURA AND WITHOUT STATUS MIGRAINOSUS, NOT INTRACTABLE: Primary | ICD-10-CM

## 2022-04-20 PROBLEM — F43.21 GRIEF AT LOSS OF CHILD: Status: RESOLVED | Noted: 2020-11-12 | Resolved: 2022-04-20

## 2022-04-20 PROBLEM — F43.21 GRIEVING: Status: RESOLVED | Noted: 2020-10-28 | Resolved: 2022-04-20

## 2022-04-20 PROBLEM — Z63.4 GRIEF AT LOSS OF CHILD: Status: RESOLVED | Noted: 2020-11-12 | Resolved: 2022-04-20

## 2022-04-20 PROBLEM — M25.571 ACUTE RIGHT ANKLE PAIN: Status: RESOLVED | Noted: 2020-10-28 | Resolved: 2022-04-20

## 2022-04-20 PROCEDURE — G8427 DOCREV CUR MEDS BY ELIG CLIN: HCPCS | Performed by: NURSE PRACTITIONER

## 2022-04-20 PROCEDURE — 99212 OFFICE O/P EST SF 10 MIN: CPT | Performed by: NURSE PRACTITIONER

## 2022-04-20 PROCEDURE — 1036F TOBACCO NON-USER: CPT | Performed by: NURSE PRACTITIONER

## 2022-04-20 PROCEDURE — G8417 CALC BMI ABV UP PARAM F/U: HCPCS | Performed by: NURSE PRACTITIONER

## 2022-04-20 ASSESSMENT — PATIENT HEALTH QUESTIONNAIRE - PHQ9
SUM OF ALL RESPONSES TO PHQ QUESTIONS 1-9: 1
4. FEELING TIRED OR HAVING LITTLE ENERGY: 0
SUM OF ALL RESPONSES TO PHQ9 QUESTIONS 1 & 2: 1
9. THOUGHTS THAT YOU WOULD BE BETTER OFF DEAD, OR OF HURTING YOURSELF: 0
SUM OF ALL RESPONSES TO PHQ QUESTIONS 1-9: 1
1. LITTLE INTEREST OR PLEASURE IN DOING THINGS: 0
10. IF YOU CHECKED OFF ANY PROBLEMS, HOW DIFFICULT HAVE THESE PROBLEMS MADE IT FOR YOU TO DO YOUR WORK, TAKE CARE OF THINGS AT HOME, OR GET ALONG WITH OTHER PEOPLE: 0
2. FEELING DOWN, DEPRESSED OR HOPELESS: 1
SUM OF ALL RESPONSES TO PHQ QUESTIONS 1-9: 1
1. LITTLE INTEREST OR PLEASURE IN DOING THINGS: 0
SUM OF ALL RESPONSES TO PHQ QUESTIONS 1-9: 1
6. FEELING BAD ABOUT YOURSELF - OR THAT YOU ARE A FAILURE OR HAVE LET YOURSELF OR YOUR FAMILY DOWN: 0
SUM OF ALL RESPONSES TO PHQ QUESTIONS 1-9: 1
5. POOR APPETITE OR OVEREATING: 0
2. FEELING DOWN, DEPRESSED OR HOPELESS: 1
7. TROUBLE CONCENTRATING ON THINGS, SUCH AS READING THE NEWSPAPER OR WATCHING TELEVISION: 0
SUM OF ALL RESPONSES TO PHQ QUESTIONS 1-9: 1
SUM OF ALL RESPONSES TO PHQ QUESTIONS 1-9: 1
3. TROUBLE FALLING OR STAYING ASLEEP: 0
SUM OF ALL RESPONSES TO PHQ9 QUESTIONS 1 & 2: 1
SUM OF ALL RESPONSES TO PHQ QUESTIONS 1-9: 1
8. MOVING OR SPEAKING SO SLOWLY THAT OTHER PEOPLE COULD HAVE NOTICED. OR THE OPPOSITE, BEING SO FIGETY OR RESTLESS THAT YOU HAVE BEEN MOVING AROUND A LOT MORE THAN USUAL: 0

## 2022-04-20 ASSESSMENT — ENCOUNTER SYMPTOMS
SCALP TENDERNESS: 0
VISUAL CHANGE: 0
SHORTNESS OF BREATH: 0
RHINORRHEA: 0
BLURRED VISION: 0
CHEST TIGHTNESS: 0
WHEEZING: 0
BACK PAIN: 0
VOMITING: 0
SINUS PRESSURE: 0
EYE REDNESS: 0
SWOLLEN GLANDS: 0
ALLERGIC/IMMUNOLOGIC COMMENTS: ALLERGIC RHINITIS

## 2022-04-20 ASSESSMENT — VISUAL ACUITY: OU: 1

## 2022-07-21 RX ORDER — FLUCONAZOLE 150 MG/1
150 TABLET ORAL ONCE
Qty: 1 TABLET | Refills: 0 | Status: SHIPPED | OUTPATIENT
Start: 2022-07-21 | End: 2022-07-21

## 2022-08-03 RX ORDER — ACYCLOVIR 800 MG/1
800 TABLET ORAL 2 TIMES DAILY
Qty: 10 TABLET | Refills: 3 | Status: SHIPPED | OUTPATIENT
Start: 2022-08-03 | End: 2022-08-08

## 2022-11-01 NOTE — PROGRESS NOTES
Alicia Swanson (:  1995) is a 32 y.o. male,Established patient, here for evaluation of the following chief complaint(s):  Follow-up ([place] paperwork ) and Other (Flu test son test positive yesterday for flu )         ASSESSMENT/PLAN:  1. Migraine without status migrainosus, not intractable, unspecified migraine type-cristal teenager, pain occurs behind right eye and is gradual, denies visual disturbance or aura. Occurs  2-3 times/week re  Comments:  -[facility] paperwork competed, patient has copy, faxed to employer  2. Screening for diabetes mellitus  -     Comprehensive Metabolic Panel; Future  3. Screening for deficiency anemia  -     CBC with Auto Differential; Future  4. Screening for thyroid disorder  -     TSH with Reflex; Future        No follow-ups on file. Subjective   Mr. Katt Martinez 11 month old son  2020, therapy with Dr. Horacio Sykes in the past was effective. He would like to have [facility] paperwork completed regarding Migraines. 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 Has chronic left knee pain, evaluated by Orthopedics, no abnormalities from MRI 2021. SUBJECTIVE/OBJECTIVE:  HPI    Migraine   This is a chronic problem. The current episode started more than 1 year ago. The problem occurs intermittently. The pain is located in the right unilateral region. The pain quality is similar to prior headaches. The quality of the pain is described as throbbing. The pain is moderate. Associated symptoms include dizziness. Pertinent negatives include no abnormal behavior, back pain, blurred vision, eye redness, hearing loss, rhinorrhea, scalp tenderness, seizures, sinus pressure, swollen glands, tinnitus, visual change, vomiting or weakness. The symptoms are aggravated by activity and bright light. He has tried Excedrin for the symptoms. The treatment provided significant relief.  His past medical history is significant for obesity. Started with migraines as a teenager. Has taken Excedrin which is usually effective. Has tried tylenol, Aleve. Last week \"felt like someone was kicking me with a steel toe boot. \" Was off work for 2 days, laid in bed. Excedrin was not effective. Migraines occurs 3-4 times weekly, twice a month the migraine is severe and has to remain in bed. Head CT obtained due to headache in 2018, no abnormalities. Has not had evaluation by Neurology. Takes Allegra daily for allergic rhinitis. Works different shifts and drives different routes with [employer], enjoys job and has benefits. Knee Pain   The incident occurred more than 1 week ago (started 2 years ago). There was no injury mechanism. The pain is present in the left knee. The quality of the pain is described as aching. The pain is moderate. The pain has been fluctuating since onset. He reports no foreign bodies present. The symptoms are aggravated by palpation and weight bearing. He has tried NSAIDs for the symptoms. The treatment provided mild relief. States has had this pain for over 2 years. Pain increased after carrying a box up several stairs. Pain is aggravated by climbing stairs. Denies pain with exercising on treadmill or exercise bicycle. Denies swelling, redness,  recent injury or fall. Performs stretching exercises in the morning and before bed. 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 He has changed shoes and inserts. Pain persists. Reports dry, flaky scalp since 2019. Has tried head and shoulders, selsum blue and applying oil to scalp. Shampooing hair weekly. Reports sores in scalp. Itchy scalp. No chemicals in hair. Has been growing hair out since 2019. Review of Systems   HENT:  Negative for hearing loss, rhinorrhea, sinus pressure and tinnitus. Eyes:  Negative for redness.    Respiratory:  Negative for chest tightness, shortness of breath and wheezing. Cardiovascular:  Negative for chest pain, palpitations and leg swelling. Gastrointestinal:  Negative for vomiting. Genitourinary:  Negative for difficulty urinating. Musculoskeletal:  Negative for back pain. Allergic/Immunologic:        Allergic rhinitis   Neurological:  Positive for dizziness and headaches. Negative for seizures, weakness and light-headedness. Migraines        Objective    height is 5' 6\" (1.676 m) and weight is 188 lb (85.3 kg). His temperature is 97.9 °F (36.6 °C). His blood pressure is 108/69 and his pulse is 89. His oxygen saturation is 98%.     BP Readings from Last 3 Encounters:   11/03/22 108/69   04/20/22 114/69   11/18/21 122/74      Wt Readings from Last 3 Encounters:   11/03/22 188 lb (85.3 kg)   04/20/22 185 lb (83.9 kg)   11/18/21 191 lb 9.3 oz (86.9 kg)      Past Medical History:   Diagnosis Date    Acute stress disorder 11/13/2020    Current moderate episode of major depressive disorder without prior episode ([facility] [address] 11/12/2020    Grief at loss of child 11/12/2020    Herpes 02/2015    Low back pain 6/4/2015    Migraines     Neck pain       Past Surgical History:   Procedure Laterality Date    PAIN MANAGEMENT PROCEDURE N/A 12/10/2020    C7-T1 INTERLAMINAR EPIDURAL STEROID INJECTION WITH FLUOROSCOPY performed by Dmitry Desai MD at [facility] History     Tobacco Use    Smoking status: Never    Smokeless tobacco: Never   Vaping Use    Vaping Use: Never used   Substance Use Topics    Alcohol use: No    Drug use: No      Family History   Problem Relation Age of Onset    No Known Problems Mother     No Known Problems Father       Outpatient Encounter Medications as of 11/3/2022   Medication Sig Dispense Refill    Ubrogepant (UBRELVY) 100 MG TABS Take 1 Dose by mouth daily as needed (migraine) 16 tablet 3    clomiPHENE (CLOMID) 50 MG tablet TAKE 1/2 TABLET BY MOUTH EVERY OTHER DAY      EPINEPHrine (EPIPEN 2-MANNIE) 0.3 MG/0.3ML SOAJ injection Use as directed for allergic reaction 1 each 0     No facility-administered encounter medications on file as of 11/3/2022. MRI LEFT KNEE 9/1/2021  1. No acute ligamentous or meniscal injury. 2. No acute osseous abnormality. HEAD CT WO CONTRAST 8/29/2018  No acute intracranial abnormality. Physical Exam  Vitals reviewed. Constitutional:       Appearance: He is well-developed. He is not diaphoretic. HENT:      Head: Normocephalic. Right Ear: Hearing and external ear normal. No tenderness. Left Ear: Hearing and external ear normal. No tenderness. Nose: Nose normal.   Eyes:      General: Lids are normal.   Cardiovascular:      Rate and Rhythm: Normal rate and regular rhythm. Heart sounds: Normal heart sounds, S1 normal and S2 normal.   Pulmonary:      Effort: Pulmonary effort is normal.      Breath sounds: Normal breath sounds. Abdominal:      General: Bowel sounds are normal. There is no distension. Palpations: Abdomen is soft. There is no mass. Tenderness: There is no right CVA tenderness, left CVA tenderness or rebound. Hernia: No hernia is present. Musculoskeletal:         General: Normal range of motion. Lymphadenopathy:      Head:      Right side of head: No submental or submandibular adenopathy. Left side of head: No submental or submandibular adenopathy. Cervical:      Right cervical: No superficial cervical adenopathy. Left cervical: No superficial cervical adenopathy. Skin:     General: Skin is warm and dry. Findings: No rash. Nails: There is no clubbing. Neurological:      Mental Status: He is alert and oriented to person, place, and time. GCS: GCS eye subscore is 4. GCS verbal subscore is 5. GCS motor subscore is 6. Psychiatric:         Speech: Speech normal.         Behavior: Behavior normal. Behavior is cooperative.          Judgment: Judgment normal.          On this date 11/3/2022 I have spent 15 minutes reviewing previous notes, test results and face to face with the patient discussing the diagnosis and importance of compliance with the treatment plan as well as documenting on the day of the visit. An electronic signature was used to authenticate this note.     --MIKE Chawla - CNP

## 2022-11-03 ENCOUNTER — OFFICE VISIT (OUTPATIENT)
Dept: PRIMARY CARE CLINIC | Age: 27
End: 2022-11-03
Payer: COMMERCIAL

## 2022-11-03 VITALS
SYSTOLIC BLOOD PRESSURE: 108 MMHG | HEART RATE: 89 BPM | OXYGEN SATURATION: 98 % | WEIGHT: 188 LBS | HEIGHT: 66 IN | BODY MASS INDEX: 30.22 KG/M2 | TEMPERATURE: 97.9 F | DIASTOLIC BLOOD PRESSURE: 69 MMHG

## 2022-11-03 DIAGNOSIS — Z13.1 SCREENING FOR DIABETES MELLITUS: ICD-10-CM

## 2022-11-03 DIAGNOSIS — Z13.0 SCREENING FOR DEFICIENCY ANEMIA: ICD-10-CM

## 2022-11-03 DIAGNOSIS — G43.909 MIGRAINE WITHOUT STATUS MIGRAINOSUS, NOT INTRACTABLE, UNSPECIFIED MIGRAINE TYPE: Primary | ICD-10-CM

## 2022-11-03 DIAGNOSIS — Z13.29 SCREENING FOR THYROID DISORDER: ICD-10-CM

## 2022-11-03 PROCEDURE — 1036F TOBACCO NON-USER: CPT | Performed by: NURSE PRACTITIONER

## 2022-11-03 PROCEDURE — G8484 FLU IMMUNIZE NO ADMIN: HCPCS | Performed by: NURSE PRACTITIONER

## 2022-11-03 PROCEDURE — 99213 OFFICE O/P EST LOW 20 MIN: CPT | Performed by: NURSE PRACTITIONER

## 2022-11-03 PROCEDURE — G8417 CALC BMI ABV UP PARAM F/U: HCPCS | Performed by: NURSE PRACTITIONER

## 2022-11-03 PROCEDURE — G8427 DOCREV CUR MEDS BY ELIG CLIN: HCPCS | Performed by: NURSE PRACTITIONER

## 2022-11-03 RX ORDER — UBROGEPANT 100 MG/1
1 TABLET ORAL DAILY PRN
Qty: 16 TABLET | Refills: 3 | Status: SHIPPED | OUTPATIENT
Start: 2022-11-03

## 2022-11-03 SDOH — ECONOMIC STABILITY: FOOD INSECURITY: WITHIN THE PAST 12 MONTHS, THE FOOD YOU BOUGHT JUST DIDN'T LAST AND YOU DIDN'T HAVE MONEY TO GET MORE.: NEVER TRUE

## 2022-11-03 SDOH — ECONOMIC STABILITY: FOOD INSECURITY: WITHIN THE PAST 12 MONTHS, YOU WORRIED THAT YOUR FOOD WOULD RUN OUT BEFORE YOU GOT MONEY TO BUY MORE.: NEVER TRUE

## 2022-11-03 ASSESSMENT — ENCOUNTER SYMPTOMS
ALLERGIC/IMMUNOLOGIC COMMENTS: ALLERGIC RHINITIS
EYE REDNESS: 0
SINUS PRESSURE: 0
BACK PAIN: 0
VOMITING: 0
RHINORRHEA: 0
CHEST TIGHTNESS: 0
SHORTNESS OF BREATH: 0
WHEEZING: 0

## 2022-11-03 ASSESSMENT — PATIENT HEALTH QUESTIONNAIRE - PHQ9
SUM OF ALL RESPONSES TO PHQ9 QUESTIONS 1 & 2: 0
SUM OF ALL RESPONSES TO PHQ QUESTIONS 1-9: 0
2. FEELING DOWN, DEPRESSED OR HOPELESS: 0
1. LITTLE INTEREST OR PLEASURE IN DOING THINGS: 0
SUM OF ALL RESPONSES TO PHQ QUESTIONS 1-9: 0

## 2022-11-03 ASSESSMENT — SOCIAL DETERMINANTS OF HEALTH (SDOH): HOW HARD IS IT FOR YOU TO PAY FOR THE VERY BASICS LIKE FOOD, HOUSING, MEDICAL CARE, AND HEATING?: NOT HARD AT ALL

## 2022-11-08 ENCOUNTER — HOSPITAL ENCOUNTER (EMERGENCY)
Age: 27
Discharge: HOME | End: 2022-11-08
Attending: EMERGENCY MEDICINE
Payer: COMMERCIAL

## 2022-11-08 VITALS
SYSTOLIC BLOOD PRESSURE: 120 MMHG | BODY MASS INDEX: 30.45 KG/M2 | DIASTOLIC BLOOD PRESSURE: 75 MMHG | TEMPERATURE: 98.9 F | RESPIRATION RATE: 18 BRPM | OXYGEN SATURATION: 99 % | WEIGHT: 194 LBS | HEIGHT: 67 IN | HEART RATE: 91 BPM

## 2022-11-08 DIAGNOSIS — J02.9 ACUTE PHARYNGITIS, UNSPECIFIED ETIOLOGY: Primary | ICD-10-CM

## 2022-11-08 LAB — S PYO AG THROAT QL: NEGATIVE

## 2022-11-08 PROCEDURE — 87880 STREP A ASSAY W/OPTIC: CPT

## 2022-11-08 PROCEDURE — 99283 EMERGENCY DEPT VISIT LOW MDM: CPT

## 2022-11-08 PROCEDURE — 87081 CULTURE SCREEN ONLY: CPT

## 2022-11-08 PROCEDURE — 6360000002 HC RX W HCPCS: Performed by: EMERGENCY MEDICINE

## 2022-11-08 RX ORDER — DEXAMETHASONE SODIUM PHOSPHATE 4 MG/ML
6 INJECTION, SOLUTION INTRA-ARTICULAR; INTRALESIONAL; INTRAMUSCULAR; INTRAVENOUS; SOFT TISSUE ONCE
Status: COMPLETED | OUTPATIENT
Start: 2022-11-08 | End: 2022-11-08

## 2022-11-08 RX ADMIN — DEXAMETHASONE SODIUM PHOSPHATE 6 MG: 4 INJECTION, SOLUTION INTRAMUSCULAR; INTRAVENOUS at 19:58

## 2022-11-08 ASSESSMENT — PAIN SCALES - GENERAL
PAINLEVEL_OUTOF10: 0
PAINLEVEL_OUTOF10: 0

## 2022-11-08 ASSESSMENT — PAIN - FUNCTIONAL ASSESSMENT
PAIN_FUNCTIONAL_ASSESSMENT: NONE - DENIES PAIN
PAIN_FUNCTIONAL_ASSESSMENT: NONE - DENIES PAIN

## 2022-11-09 NOTE — ED NOTES
Discharge and education instructions reviewed. Patient verbalized understanding, teach-back successful. Patient denied questions at this time. No acute distress noted. Patient instructed to follow-up as noted - return to emergency department if symptoms worsen. Patient verbalized understanding. Discharged per EDMD with discharge instructions.         Anh Mccann RN  11/08/22 2014

## 2022-11-09 NOTE — ED TRIAGE NOTES
Patient to the ED with c/o terrible cough, hoarse voice, dry and sore throat. Completed treatment for influenza today.

## 2022-11-10 ASSESSMENT — ENCOUNTER SYMPTOMS
STRIDOR: 0
TROUBLE SWALLOWING: 0
WHEEZING: 0
VOICE CHANGE: 1
SORE THROAT: 1

## 2022-11-10 NOTE — ED PROVIDER NOTES
[id number] [facility]  [facility]      Pt Name: Nadia Baker  MRN: [id number]  Skylargfvioletta 1995  Date ofevaluation: 11/8/2022  Provider: Karina Lazo MD    CHIEF COMPLAINT       Chief Complaint   Patient presents with    Influenza     States he was diagnosed on 11/4 and finished his medication, states his throat is dry and sore, terrible cough and losing his voice         HISTORY OF PRESENT ILLNESS   (Location/Symptom, Timing/Onset,Context/Setting, Quality, Duration, Modifying Factors, Severity)  Note limiting factors. Nadia Baker is a 32 y.o. male  who  has a past medical history of Acute stress disorder, Current moderate episode of major depressive disorder without prior episode ([facility] [address] Grief at loss of child, Herpes, Low back pain, Migraines, and Neck pain. who presents to the emergency department for evaluation of sore throat. Patient reports he recently diagnosed with flu and completed a course of Tamiflu. He reports over the past day he has had progressive worsening sore throat and has been losing his voice. Denies difficulty swallowing or breathing. Denies fevers    HPI    NursingNotes were reviewed. REVIEW OF SYSTEMS    (2-9 systems for level 4, 10 or more for level 5)     Review of Systems   Constitutional:  Negative for fever. HENT:  Positive for sore throat and voice change. Negative for congestion, drooling and trouble swallowing. Respiratory:  Negative for wheezing and stridor. Except as noted above the remainder of the review of systems was reviewed and negative.        PAST MEDICAL HISTORY     Past Medical History:   Diagnosis Date    Acute stress disorder 11/13/2020    Current moderate episode of major depressive disorder without prior episode ([facility] [address] 11/12/2020    Grief at loss of child 11/12/2020    Herpes 02/2015    Low back pain 6/4/2015    Migraines     Neck pain          SURGICALHISTORY       Past Surgical History:   Procedure Laterality Date    PAIN MANAGEMENT PROCEDURE N/A 12/10/2020    C7-T1 INTERLAMINAR EPIDURAL STEROID INJECTION WITH FLUOROSCOPY performed by Maida Smith MD at [address] [facility] [address]       Discharge Medication List as of 11/8/2022  8:09 PM        CONTINUE these medications which have NOT CHANGED    Details   Ubrogepant (UBRELVY) 100 MG TABS Take 1 Dose by mouth daily as needed (migraine), Disp-16 tablet, R-3Normal      clomiPHENE (CLOMID) 50 MG tablet TAKE 1/2 TABLET BY MOUTH EVERY OTHER DAYHistorical Med      EPINEPHrine (EPIPEN 2-MANNIE) 0.3 MG/0.3ML SOAJ injection Use as directed for allergic reaction, Disp-1 each, R-0Print                  Lactose    FAMILY HISTORY       Family History   Problem Relation Age of Onset    No Known Problems Mother     No Known Problems Father           SOCIAL HISTORY       Social History     Socioeconomic History    Marital status: Single     Spouse name: None    Number of children: 1    Years of education: None    Highest education level: None   Occupational History    Occupation: [employer]    Tobacco Use    Smoking status: Never    Smokeless tobacco: Never   Vaping Use    Vaping Use: Never used   Substance and Sexual Activity    Alcohol use: No    Drug use: No    Sexual activity: Yes     Partners: Female   Social History Narrative    Lives with significant other and son     Social Determinants of Health     Financial Resource Strain: Low Risk     Difficulty of Paying Living Expenses: Not hard at all   Food Insecurity: No Food Insecurity    Worried About Running Out of Food in the Last Year: Never true    [address] in the Last Year: Never true       SCREENINGS    Ezequiel Coma Scale  Eye Opening: Spontaneous  Best Verbal Response: Oriented  Best Motor Response: Obeys commands  Beulah Coma Scale Score: 15        PHYSICAL EXAM    (up to 7 for level 4, 8 or more for level 5)     ED Triage Vitals [11/08/22 1933]   BP Temp Temp Source Heart Rate Resp SpO2 Height Weight   120/75 98.9 °F (37.2 °C) Oral 91 18 99 % 5' 7\" (1.702 m) 194 lb 0.1 oz (88 kg)       Physical Exam  Vitals reviewed. Constitutional:       General: He is not in acute distress. Appearance: He is well-developed. HENT:      Head: Normocephalic and atraumatic. Mouth/Throat:      Mouth: Mucous membranes are moist.      Pharynx: Oropharynx is clear. No oropharyngeal exudate or posterior oropharyngeal erythema. Eyes:      Conjunctiva/sclera: Conjunctivae normal.   Neck:      Trachea: No tracheal deviation. Cardiovascular:      Rate and Rhythm: Normal rate and regular rhythm. Pulmonary:      Effort: Pulmonary effort is normal.      Breath sounds: Normal breath sounds. No wheezing or rales. Abdominal:      General: There is no distension. Palpations: Abdomen is soft. Tenderness: There is no abdominal tenderness. Musculoskeletal:         General: No deformity. Normal range of motion. Cervical back: Normal range of motion. Skin:     General: Skin is warm and dry. Neurological:      Mental Status: He is alert and oriented to person, place, and time. RESULTS     EKG: All EKG's are interpreted by the Emergency Department Physician who either signs or Co-signsthis chart in the absence of a cardiologist.      RADIOLOGY:   Non-plain filmimages such as CT, Ultrasound and MRI are read by the radiologist. Plain radiographic images are visualized and preliminarily interpreted by the emergency physician with the below findings:      Interpretation per the Radiologist below, if available at the time ofthis note:    No orders to display         ED BEDSIDE ULTRASOUND:   Performed by ED Physician - none    LABS:  Labs Reviewed   STREP SCREEN GROUP A THROAT   CULTURE, BETA [address]       All other labs were within normal range or not returned as of this dictation.     EMERGENCY DEPARTMENT COURSE and DIFFERENTIAL DIAGNOSIS/MDM:   Vitals:    Vitals:    11/08/22 1933   BP: 120/75   Pulse: 91   Resp: 18   Temp: 98.9 °F (37.2 °C)   TempSrc: Oral   SpO2: 99%   Weight: 194 lb 0.1 oz (88 kg)   Height: 5' 7\" (1.702 m)       Patient was given thefollowing medications:  Medications   dexamethasone (DECADRON) injection 6 mg (6 mg Oral Given 11/8/22 1958)       ED COURSE & MEDICAL DECISION MAKING    Pertinent Labs & Imaging studies reviewed. (See chart for details)   -  Patient seen and evaluated in the emergency department. -  Triage and nursing notes reviewed and incorporated. -  Old chart records reviewed and incorporated. -  Differential diagnosis includes: Differential diagnosis: Group A strep, Airway Obstruction, Epiglottitis, Retropharyngeal Abscess, Parapharyngeal Abscess, Pneumonia, Hypoxemia, Dehydration, Reflux Pharyngitis, other    -  Work-up included:  See above  -  ED treatment included: See above  -  Results discussed with patient. Patient with recent flu diagnosis presents for sore throat. Patient is managing his secretions, oropharynx clear and symmetrical.  labs show negative rapid strep. Patient has no signs or symptoms concerning for pending airway obstruction. patient feels improved on reevaluation. Symptomatic treatment with expectant management discussed with the patient and they and/or family members present are amenable to treatment plan and outpatient follow-up. Strict return precautions were discussed with the patient and those present. They demonstrated understanding of when to return to the emergency department for new or worsening symptoms. .  The patient is agreeable with plan of care and disposition. Is this patient to be included in the SEP-1 Core Measure due to severe sepsis or septic shock? No   Exclusion criteria - the patient is NOT to be included for SEP-1 Core Measure due to:  2+ SIRS criteria are not met      REASSESSMENT          CRITICAL CARE TIME   Total Critical Care time was 0 minutes, excluding separately reportable procedures.   There was a high probability of clinically significant/life threatening deterioration in the patient's condition which required my urgent intervention. CONSULTS:  None    PROCEDURES:  Unless otherwise noted below, none     Procedures    FINAL IMPRESSION      1.  Acute pharyngitis, unspecified etiology          DISPOSITION/PLAN   DISPOSITION Decision To Discharge 11/08/2022 08:04:40 PM      PATIENT REFERREDTO:  Bora Ortiz, APRN - [facility]  [address] [phone]    Schedule an appointment as soon as possible for a visit   As needed      DISCHARGEMEDICATIONS:  Discharge Medication List as of 11/8/2022  8:09 PM        START taking these medications    Details   benzocaine-Menthol (CEPACOL INSTAMAX) 15-20 MG lozenge Take 1 lozenge by mouth every 2 hours as needed for Sore Throat, Disp-16 lozenge, R-0Normal                (Please note that portions of this note were completed with a voice recognition program.  Efforts were made to edit the dictations but occasionally words are mis-transcribed.)    Ricki Macias MD (electronically signed)  Attending Emergency Physician          Ricki Macias MD  11/10/22 [id number]

## 2022-11-11 LAB — S PYO THROAT QL CULT: NORMAL

## 2022-12-12 ENCOUNTER — OFFICE VISIT (OUTPATIENT)
Dept: ORTHOPEDIC SURGERY | Age: 27
End: 2022-12-12
Payer: COMMERCIAL

## 2022-12-12 VITALS — WEIGHT: 188.6 LBS | BODY MASS INDEX: 29.6 KG/M2 | RESPIRATION RATE: 16 BRPM | HEIGHT: 67 IN

## 2022-12-12 DIAGNOSIS — M54.2 NECK PAIN: Primary | ICD-10-CM

## 2022-12-12 PROCEDURE — 99213 OFFICE O/P EST LOW 20 MIN: CPT | Performed by: ORTHOPAEDIC SURGERY

## 2022-12-12 PROCEDURE — G8484 FLU IMMUNIZE NO ADMIN: HCPCS | Performed by: ORTHOPAEDIC SURGERY

## 2022-12-12 PROCEDURE — G8427 DOCREV CUR MEDS BY ELIG CLIN: HCPCS | Performed by: ORTHOPAEDIC SURGERY

## 2022-12-12 PROCEDURE — 1036F TOBACCO NON-USER: CPT | Performed by: ORTHOPAEDIC SURGERY

## 2022-12-12 PROCEDURE — G8417 CALC BMI ABV UP PARAM F/U: HCPCS | Performed by: ORTHOPAEDIC SURGERY

## 2022-12-12 RX ORDER — CYCLOBENZAPRINE HCL 5 MG
5 TABLET ORAL 3 TIMES DAILY PRN
Qty: 30 TABLET | Refills: 0 | Status: SHIPPED | OUTPATIENT
Start: 2022-12-12 | End: 2022-12-22

## 2022-12-12 NOTE — PROGRESS NOTES
CHIEF COMPLAINT: Neck pain    History:   Mr. Nadia Baker is a 32 y.o. male  here for evaluation of neck pain. The patient was self-referred. He reports prior history of neck pain. He previously saw DES Rodriguez and had injection by Dr. Lenka Vigil, which he states did help. This is evaluated as a personal injury. Pain is rated as a 8/10. There was not a history of injury. Pain is located along his trapezius and radiates into his biceps. No radicular symptoms down to his fingers. No numbness and tingling. The patient does not have any bowel or bladder incontinence. The patient has not had physical therapy. The patient has not had injections. The patient has tried NSAIDs, naproxen, without relief. He has not tried ice.   He has not had any physical therapy      Past Medical History:   Diagnosis Date    Acute stress disorder 11/13/2020    Current moderate episode of major depressive disorder without prior episode ([facility] [address] 11/12/2020    Grief at loss of child 11/12/2020    Herpes 02/2015    Low back pain 6/4/2015    Migraines     Neck pain        Past Surgical History:   Procedure Laterality Date    PAIN MANAGEMENT PROCEDURE N/A 12/10/2020    C7-T1 INTERLAMINAR EPIDURAL STEROID INJECTION WITH FLUOROSCOPY performed by Cornell Sorensen MD at [facility] History   Problem Relation Age of Onset    No Known Problems Mother     No Known Problems Father        Social History     Socioeconomic History    Marital status: Single    Number of children: 1   Occupational History    Occupation: [employer]    Tobacco Use    Smoking status: Never    Smokeless tobacco: Never   Vaping Use    Vaping Use: Never used   Substance and Sexual Activity    Alcohol use: No    Drug use: No    Sexual activity: Yes     Partners: Female   Social History Narrative    Lives with significant other and son     Social Determinants of Health     Financial Resource Strain: Low Risk     Difficulty of Paying Living Expenses: Not hard at all   Food Insecurity: No Food Insecurity    Worried About [address] [facility] in the Last Year: Never true    Ran Out of Food in the Last Year: Never true       Current Outpatient Medications   Medication Sig Dispense Refill    benzocaine-Menthol (CEPACOL INSTAMAX) 15-20 MG lozenge Take 1 lozenge by mouth every 2 hours as needed for Sore Throat 16 lozenge 0    Ubrogepant (UBRELVY) 100 MG TABS Take 1 Dose by mouth daily as needed (migraine) 16 tablet 3    clomiPHENE (CLOMID) 50 MG tablet TAKE 1/2 TABLET BY MOUTH EVERY OTHER DAY      EPINEPHrine (EPIPEN 2-MANNIE) 0.3 MG/0.3ML SOAJ injection Use as directed for allergic reaction 1 each 0     No current facility-administered medications for this visit. Allergies   Allergen Reactions    Lactose Anaphylaxis            Physical Examination:     Vital signs:  Resp 16   Ht 5' 7\" (1.702 m)   Wt 188 lb 9.6 oz (85.5 kg)   BMI 29.54 kg/m²     General:  alert, appears stated age, cooperative, and no distress   Gait:  Normal.         Examination of the back reveals no obvious deformity. He does not have evidence of a prior surgical scar. There is pain with flexion of the cervical spine. There is pain with extension of the cervical spine. He does not have tenderness to palpation in the midline cervical spine. He does have tenderness to palpation in the left paraspinal region of the cervical spine. He has tenderness to palpation along his trapezius. Manual muscle strength testing is 5/5 bilaterally in deltoid, biceps, triceps, wrist extensors, wrist flexors, , knee flexion, knee extension, dorsiflexion, plantarflexion, and EHL. Sensation is intact to light touch in the C5-T1 and L3-S1 nerve distribution bilaterally. Normal deep tendon reflexes at the knees and no clonus. Negative Ng's sign. There is no pain with ROM of the hips, knees or ankles. There is no cyanosis, clubbing or edema and the vasculature is intact.      Imaging:  Cervical spine Xrays: AP, lateral views obtained and reviewed. No fracture or dislocation. Maintained disc spaces. Cervical spine MRI 2020:  1. Degenerative changes contribute to neural foraminal narrowing at C3-C4 and   C5-C6. 2. No significant spinal canal stenosis. 3. Slight straightening of the normal cervical lordosis without   spondylolisthesis. Assessment:     Cervical paraspinal/trapezius strain      Plan:     His symptoms seem more to be muscular in origin to me at this time. Prescription for Flexeril E scribed. Ice/heat to cervical paraspinal region and trapezius. Continue NSAIDs as needed    If symptoms fail to improve with the above treatment, can consider physical therapy or reevaluation by Dr. Lakia Pineda. Timmy Salazar. Sathyakatheryn Zaragoza MD  Orthopaedic Surgery and Sports Medicine    Disclaimer: This note was generated with use of a verbal recognition program and an attempt was made to check for errors. It is possible that there are still dictated errors within this office note. If so, please bring any significant errors to my attention for an addendum. All efforts were made to ensure that this office note is accurate.

## 2023-01-27 ENCOUNTER — APPOINTMENT (OUTPATIENT)
Dept: CT IMAGING | Age: 28
End: 2023-01-27
Payer: COMMERCIAL

## 2023-01-27 ENCOUNTER — HOSPITAL ENCOUNTER (EMERGENCY)
Age: 28
Discharge: HOME | End: 2023-01-27
Attending: EMERGENCY MEDICINE
Payer: COMMERCIAL

## 2023-01-27 VITALS
DIASTOLIC BLOOD PRESSURE: 69 MMHG | WEIGHT: 189.6 LBS | TEMPERATURE: 97.7 F | HEART RATE: 81 BPM | OXYGEN SATURATION: 99 % | BODY MASS INDEX: 29.76 KG/M2 | HEIGHT: 67 IN | RESPIRATION RATE: 18 BRPM | SYSTOLIC BLOOD PRESSURE: 122 MMHG

## 2023-01-27 DIAGNOSIS — G43.709 CHRONIC MIGRAINE WITHOUT AURA WITHOUT STATUS MIGRAINOSUS, NOT INTRACTABLE: Primary | ICD-10-CM

## 2023-01-27 PROCEDURE — 6360000002 HC RX W HCPCS: Performed by: EMERGENCY MEDICINE

## 2023-01-27 PROCEDURE — 6370000000 HC RX 637 (ALT 250 FOR IP): Performed by: EMERGENCY MEDICINE

## 2023-01-27 PROCEDURE — 96372 THER/PROPH/DIAG INJ SC/IM: CPT

## 2023-01-27 PROCEDURE — 99284 EMERGENCY DEPT VISIT MOD MDM: CPT

## 2023-01-27 PROCEDURE — 70450 CT HEAD/BRAIN W/O DYE: CPT

## 2023-01-27 RX ORDER — METOCLOPRAMIDE 10 MG/1
10 TABLET ORAL ONCE
Status: COMPLETED | OUTPATIENT
Start: 2023-01-27 | End: 2023-01-27

## 2023-01-27 RX ORDER — DIPHENHYDRAMINE HCL 25 MG
25 TABLET ORAL ONCE
Status: COMPLETED | OUTPATIENT
Start: 2023-01-27 | End: 2023-01-27

## 2023-01-27 RX ORDER — KETOCONAZOLE 20 MG/G
CREAM TOPICAL
COMMUNITY
Start: 2022-12-01

## 2023-01-27 RX ORDER — ACETAMINOPHEN 500 MG
1000 TABLET ORAL ONCE
Status: COMPLETED | OUTPATIENT
Start: 2023-01-27 | End: 2023-01-27

## 2023-01-27 RX ORDER — FLUOCINONIDE TOPICAL SOLUTION USP, 0.05% 0.5 MG/ML
SOLUTION TOPICAL
COMMUNITY
Start: 2022-12-01

## 2023-01-27 RX ORDER — KETOROLAC TROMETHAMINE 30 MG/ML
30 INJECTION, SOLUTION INTRAMUSCULAR; INTRAVENOUS ONCE
Status: COMPLETED | OUTPATIENT
Start: 2023-01-27 | End: 2023-01-27

## 2023-01-27 RX ADMIN — DIPHENHYDRAMINE HCL 25 MG: 25 TABLET ORAL at 05:09

## 2023-01-27 RX ADMIN — KETOROLAC TROMETHAMINE 30 MG: 30 INJECTION, SOLUTION INTRAMUSCULAR; INTRAVENOUS at 05:44

## 2023-01-27 RX ADMIN — METOCLOPRAMIDE 10 MG: 10 TABLET ORAL at 05:09

## 2023-01-27 RX ADMIN — ACETAMINOPHEN 1000 MG: 500 TABLET ORAL at 05:09

## 2023-01-27 ASSESSMENT — PAIN - FUNCTIONAL ASSESSMENT
PAIN_FUNCTIONAL_ASSESSMENT: ACTIVITIES ARE NOT PREVENTED
PAIN_FUNCTIONAL_ASSESSMENT: 0-10
PAIN_FUNCTIONAL_ASSESSMENT: ACTIVITIES ARE NOT PREVENTED
PAIN_FUNCTIONAL_ASSESSMENT: 0-10

## 2023-01-27 ASSESSMENT — PAIN SCALES - GENERAL
PAINLEVEL_OUTOF10: 7
PAINLEVEL_OUTOF10: 10

## 2023-01-27 ASSESSMENT — PAIN DESCRIPTION - LOCATION
LOCATION: HEAD

## 2023-01-27 ASSESSMENT — ENCOUNTER SYMPTOMS
SHORTNESS OF BREATH: 0
BACK PAIN: 0
ABDOMINAL PAIN: 0
PHOTOPHOBIA: 1
NAUSEA: 1
RESPIRATORY NEGATIVE: 1

## 2023-01-27 ASSESSMENT — PAIN DESCRIPTION - DESCRIPTORS
DESCRIPTORS: ACHING

## 2023-01-27 ASSESSMENT — PAIN DESCRIPTION - PAIN TYPE
TYPE: ACUTE PAIN;CHRONIC PAIN
TYPE: ACUTE PAIN;CHRONIC PAIN

## 2023-01-27 ASSESSMENT — PAIN DESCRIPTION - FREQUENCY: FREQUENCY: INTERMITTENT

## 2023-01-27 NOTE — ED PROVIDER NOTES
[address] ENCOUNTER        Pt Name: Lalitha Yoder  MRN: [id number]  [place] 1995  Date of evaluation: 1/27/2023  Provider: Lacho Jackson MD  PCP: No primary care provider on file. Note Started: 5:23 AM EST 1/27/23    CHIEF COMPLAINT       Headache for 4 days      HISTORY OF PRESENT ILLNESS: 1 or more Elements     History from : Patient    Limitations to history : None    Lalitha Yoder is a 32 y.o. male who presents for migraine headache which has been going on for the last 3 to 4 days. Significantly worse this evening starting around 2 AM.  Patient has a history of migraines and states usually about 5 days a month he has a migraine headache that usually last anywhere between 24 and 48 hours but this 1 has been going on for a longer period of time. Not the worst headache of his life. But states that he cannot get it to go away. He follows with neurology and takes prescription medicine for his headaches. It was not working so he decided take Excedrin which still is not helping. Nothing else seems to make it better. Associated with mild nausea and photophobia. No neck pain. No numbness or weakness. No recent trauma. Pain is aching and throbbing in nature generalized over his head. Similar to previous migraines. Nursing Notes were all reviewed and agreed with or any disagreements were addressed in the HPI. REVIEW OF SYSTEMS :      Review of Systems   Constitutional: Negative. Negative for chills and fever. HENT: Negative. Eyes:  Positive for photophobia. Respiratory: Negative. Negative for shortness of breath. Cardiovascular: Negative. Negative for chest pain. Gastrointestinal:  Positive for nausea. Negative for abdominal pain. Genitourinary: Negative. Musculoskeletal: Negative. Negative for back pain. Skin: Negative. Neurological:  Positive for headaches.      Positives and Pertinent negatives as per HPI. SURGICAL HISTORY     Past Surgical History:   Procedure Laterality Date    PAIN MANAGEMENT PROCEDURE N/A 12/10/2020    C7-T1 INTERLAMINAR EPIDURAL STEROID INJECTION WITH FLUOROSCOPY performed by Amalia Pinon MD [facility]       Previous Medications    EPINEPHRINE (EPIPEN 2-MANNIE) 0.3 MG/0.3ML SOAJ INJECTION    Use as directed for allergic reaction    FLUOCINONIDE (LIDEX) 0.05 % EXTERNAL SOLUTION        KETOCONAZOLE (NIZORAL) 2 % CREAM        UBROGEPANT (UBRELVY) 100 MG TABS    Take 1 Dose by mouth daily as needed (migraine)       ALLERGIES     Lactose    FAMILYHISTORY       Family History   Problem Relation Age of Onset    No Known Problems Mother     No Known Problems Father         SOCIAL HISTORY       Social History     Tobacco Use    Smoking status: Never    Smokeless tobacco: Never   Vaping Use    Vaping Use: Never used   Substance Use Topics    Alcohol use: No    Drug use: No       SCREENINGS                         CIWA Assessment  BP: 122/69  Heart Rate: 81           PHYSICAL EXAM  1 or more Elements     ED Triage Vitals [01/27/23 0449]   BP Temp Temp Source Heart Rate Resp SpO2 Height Weight   122/69 97.7 °F (36.5 °C) Oral 81 18 99 % 5' 7\" (1.702 m) 189 lb 9.5 oz (86 kg)       Physical Exam  Vitals and nursing note reviewed. Constitutional:       General: He is not in acute distress. Appearance: He is well-developed and normal weight. He is not ill-appearing, toxic-appearing or diaphoretic. HENT:      Head: Normocephalic and atraumatic. Right Ear: External ear normal.      Left Ear: External ear normal.      Nose: Nose normal.      Mouth/Throat:      Mouth: Mucous membranes are moist.      Pharynx: Oropharynx is clear. Eyes:      Extraocular Movements: Extraocular movements intact. Conjunctiva/sclera: Conjunctivae normal.      Pupils: Pupils are equal, round, and reactive to light.    Cardiovascular:      Rate and Rhythm: Normal rate and regular rhythm. Pulses: Normal pulses. Heart sounds: Normal heart sounds. Pulmonary:      Effort: Pulmonary effort is normal.      Breath sounds: Normal breath sounds. No decreased breath sounds, wheezing, rhonchi or rales. Chest:      Chest wall: No tenderness. Abdominal:      General: Bowel sounds are normal. There is no distension. Palpations: Abdomen is soft. Tenderness: There is no abdominal tenderness. There is no guarding or rebound. Musculoskeletal:         General: Normal range of motion. Cervical back: Normal range of motion and neck supple. Right lower leg: No edema. Left lower leg: No edema. Skin:     General: Skin is warm and dry. Capillary Refill: Capillary refill takes less than 2 seconds. Findings: No rash. Neurological:      General: No focal deficit present. Mental Status: He is alert and oriented to person, place, and time. GCS: GCS eye subscore is 4. GCS verbal subscore is 5. GCS motor subscore is 6. Cranial Nerves: No cranial nerve deficit, dysarthria or facial asymmetry. Sensory: Sensation is intact. No sensory deficit. Motor: Motor function is intact. No weakness, tremor, atrophy, abnormal muscle tone, seizure activity or pronator drift. Coordination: Coordination is intact. Coordination normal. Finger-Nose-Finger Test and Heel to [facility] Test normal.   Psychiatric:         Mood and Affect: Mood normal.         Behavior: Behavior normal.         DIAGNOSTIC RESULTS   LABS:    Labs Reviewed - No data to display    When ordered only abnormal lab results are displayed. All other labs were within normal range or not returned as of this dictation.       RADIOLOGY:   Non-plain film images such as CT, Ultrasound and MRI are read by the radiologist. Plain radiographic images are visualized and preliminarily interpreted by the ED Provider with the below findings:    Interpretation per the Radiologist below, if available at the time of this note:    CT Head W/O Contrast   Final Result   No acute intracranial abnormality. Paranasal sinus disease           No results found. No results found. PROCEDURES   Unless otherwise noted below, none     Procedures    CRITICAL CARE TIME   Total Critical Care time was 0 minutes, excluding separately reportable procedures. There was a high probability of clinically significant/life threatening deterioration in the patient's condition which required my urgent intervention. This includes multiple reevaluations, vital sign monitoring, pulse oximetry monitoring, telemetry monitoring, clinical response to the IV medications, reviewing the nursing notes, consultation time, dictation/documentation time, and interpretation of the labwork. (This time excludes time spent performing procedures). PAST MEDICAL HISTORY      has a past medical history of Acute stress disorder (11/13/2020), Current moderate episode of major depressive disorder without prior episode ([facility] [address] (11/12/2020), Grief at loss of child (11/12/2020), Herpes (02/2015), Low back pain (6/4/2015), Migraines, and Neck pain. EMERGENCY DEPARTMENT COURSE and DIFFERENTIAL DIAGNOSIS/MDM:   Vitals:    Vitals:    01/27/23 0449   BP: 122/69   Pulse: 81   Resp: 18   Temp: 97.7 °F (36.5 °C)   TempSrc: Oral   SpO2: 99%   Weight: 189 lb 9.5 oz (86 kg)   Height: 5' 7\" (1.702 m)       Is this patient to be included in the SEP-1 Core Measure due to severe sepsis or septic shock? No   Exclusion criteria - the patient is NOT to be included for SEP-1 Core Measure due to: Infection is not suspected    Chronic Conditions: Migraines    CONSULTS: (Who and What was discussed)  None    Discussion with Other Profesionals : None    Social Determinants : None    Records Reviewed :  Outpatient Notes reviewed most recent primary care note from November 3, 2022 for migraine headache    CC/HPI Summary, DDx, ED Course, and Reassessment:     Differential Diagnosis: Tension headache, subarachnoid hemorrhage, Meningitis, Temporal arteritis, Pseudotumor Cerebri, Migraine, other    73-year-old male presents for migraine headache for the last 4 days worse over the last 3 to 4 hours. No focal neurologic deficits. No neck pain. No meningitic symptoms. Patient is afebrile not tachycardic saturating well on room air normotensive. Vision is unremarkable. No concern for pseudotumor cerebri. No concern for subarachnoid hemorrhage not thunderclap in nature and not the worst headache of his life. Patient is already on prescription medication at home for headaches. Will give patient Tylenol p.o., Reglan p.o., Benadryl p.o. and reevaluate. Will obtain CT head and if no obvious signs of bleeding at this time will give Toradol IM. No other signs systemic sepsis. Will reeval closely and disposition accordingly. ED Course as of 01/27/23 0554   Fri Jan 27, 2023   0534 CT head without any obvious signs of bleeding. Will order Toradol IM [SC]   0550 Headache is slightly improved.   Will give patient slightly longer observation period for the Toradol but then if headache continues to improve will discharge with strict turn precautions for any new or worsening symptoms as well as referral to neurology a new primary care physician as he states he lost his primary care physician [SC]      ED Course User Index  [SC] Dotty Campa MD       Patient was given the following medications:  Medications   acetaminophen (TYLENOL) tablet 1,000 mg (1,000 mg Oral Given 1/27/23 0509)   metoclopramide (REGLAN) tablet 10 mg (10 mg Oral Given 1/27/23 0509)   diphenhydrAMINE (BENADRYL) tablet 25 mg (25 mg Oral Given 1/27/23 0509)   ketorolac (TORADOL) injection 30 mg (30 mg IntraMUSCular Given 1/27/23 0562)       Disposition Considerations (tests considered but not done, Shared Decision Making, Pt Expectation of Test or Tx.): Shared decision-making used for discharge  Diagnosis Severity: Mild migraine headache without status migrainosus      Appropriate for outpatient management      I estimate there is LOW risk for SUBARACHNOID HEMORRHAGE, MENINGITIS, INTRACRANIAL HEMORRHAGE, ENCEPHALITIS, TEMPORAL ARTERITIS, PSEUDOTUMOR CEREBIR, ACUTE GLAUCOMA, SUBDURAL OR EPIDURAL HEMATOMA, OR STROKE, thus I consider the discharge disposition reasonable. The patient is at low risk for mortality based on demographic, history and clinical factors. Given the best available information and clinical assessment, I estimate the risk of hospitalization to be greater than risk of treatment at home. I have explained to the patient that the risk could rapidly change, given precautions for return and instructions. Explained to patient that the risk for mortality is low based on demographic, history and clinical factors. I discussed with patient the results of evaluation in the ED, diagnosis, care, and prognosis. The plan is to discharge to home. Patient is in agreement with plan and questions have been answered. I also discussed with patient the reasons which may require a return visit and the importance of follow-up care. The patient is well-appearing, nontoxic, and improved at the time of discharge. Patient agrees to call to arrange follow-up care as directed. Patient understands to return immediately for worsening/change in symptoms. I am the Primary Clinician of Record. FINAL IMPRESSION      1.  Chronic migraine without aura without status migrainosus, not intractable          DISPOSITION/PLAN     DISPOSITION Discharge - Pending Orders Complete 01/27/2023 05:53:25 AM      PATIENT REFERRED TO:  [facility]) Pre-Services  [phone]  Schedule an appointment as soon as possible for a visit       [facility] NEUROLOGY  [address] [place] [id number]  Schedule an appointment as soon as possible for a visit       DISCHARGE MEDICATIONS:  New Prescriptions    No medications on file DISCONTINUED MEDICATIONS:  Discontinued Medications    BENZOCAINE-MENTHOL (CEPACOL INSTAMAX) 15-20 MG LOZENGE    Take 1 lozenge by mouth every 2 hours as needed for Sore Throat    CLOMIPHENE (CLOMID) 50 MG TABLET    TAKE 1/2 TABLET BY MOUTH EVERY OTHER DAY              (Please note that portions of this note were completed with a voice recognition program.  Efforts were made to edit the dictations but occasionally words are mis-transcribed.)    Joe Momin MD (electronically signed)           Joe Momin MD  01/27/23 1983

## 2023-02-17 DIAGNOSIS — M54.2 NECK PAIN: ICD-10-CM

## 2023-02-17 RX ORDER — CYCLOBENZAPRINE HCL 5 MG
TABLET ORAL
Qty: 30 TABLET | Refills: 0 | Status: SHIPPED | OUTPATIENT
Start: 2023-02-17

## 2023-02-17 NOTE — TELEPHONE ENCOUNTER
Patient last seen 12/12/2022 and medication last filled 12/12/2022:     Last lab result completed/reported on: 11/3/2022

## 2023-08-04 ENCOUNTER — APPOINTMENT (OUTPATIENT)
Dept: GENERAL RADIOLOGY | Age: 28
End: 2023-08-04
Payer: COMMERCIAL

## 2023-08-04 ENCOUNTER — HOSPITAL ENCOUNTER (EMERGENCY)
Age: 28
Discharge: HOME | End: 2023-08-04
Attending: EMERGENCY MEDICINE
Payer: COMMERCIAL

## 2023-08-04 VITALS
OXYGEN SATURATION: 100 % | SYSTOLIC BLOOD PRESSURE: 128 MMHG | TEMPERATURE: 97.3 F | BODY MASS INDEX: 30.83 KG/M2 | HEART RATE: 84 BPM | HEIGHT: 67 IN | DIASTOLIC BLOOD PRESSURE: 71 MMHG | WEIGHT: 196.43 LBS | RESPIRATION RATE: 17 BRPM

## 2023-08-04 DIAGNOSIS — M25.571 ACUTE RIGHT ANKLE PAIN: Primary | ICD-10-CM

## 2023-08-04 PROCEDURE — 6370000000 HC RX 637 (ALT 250 FOR IP): Performed by: EMERGENCY MEDICINE

## 2023-08-04 PROCEDURE — 73610 X-RAY EXAM OF ANKLE: CPT

## 2023-08-04 PROCEDURE — 99283 EMERGENCY DEPT VISIT LOW MDM: CPT

## 2023-08-04 RX ORDER — NAPROXEN 250 MG/1
500 TABLET ORAL ONCE
Status: COMPLETED | OUTPATIENT
Start: 2023-08-04 | End: 2023-08-04

## 2023-08-04 RX ORDER — NAPROXEN 500 MG/1
500 TABLET ORAL 2 TIMES DAILY WITH MEALS
Qty: 60 TABLET | Refills: 5 | Status: SHIPPED | OUTPATIENT
Start: 2023-08-04

## 2023-08-04 RX ORDER — LIDOCAINE 4 G/G
1 PATCH TOPICAL ONCE
Status: DISCONTINUED | OUTPATIENT
Start: 2023-08-04 | End: 2023-08-04 | Stop reason: HOSPADM

## 2023-08-04 RX ADMIN — NAPROXEN SODIUM 500 MG: 250 TABLET ORAL at 05:34

## 2023-08-04 ASSESSMENT — PAIN DESCRIPTION - PAIN TYPE
TYPE: ACUTE PAIN
TYPE: ACUTE PAIN

## 2023-08-04 ASSESSMENT — PAIN - FUNCTIONAL ASSESSMENT
PAIN_FUNCTIONAL_ASSESSMENT: 0-10
PAIN_FUNCTIONAL_ASSESSMENT: PREVENTS OR INTERFERES SOME ACTIVE ACTIVITIES AND ADLS
PAIN_FUNCTIONAL_ASSESSMENT: 0-10
PAIN_FUNCTIONAL_ASSESSMENT: PREVENTS OR INTERFERES SOME ACTIVE ACTIVITIES AND ADLS

## 2023-08-04 ASSESSMENT — PAIN DESCRIPTION - LOCATION
LOCATION: ANKLE
LOCATION: ANKLE;FOOT
LOCATION: ANKLE

## 2023-08-04 ASSESSMENT — PAIN SCALES - GENERAL
PAINLEVEL_OUTOF10: 10
PAINLEVEL_OUTOF10: 6
PAINLEVEL_OUTOF10: 10

## 2023-08-04 ASSESSMENT — PAIN DESCRIPTION - FREQUENCY
FREQUENCY: CONTINUOUS
FREQUENCY: CONTINUOUS

## 2023-08-04 ASSESSMENT — PAIN DESCRIPTION - ORIENTATION
ORIENTATION: RIGHT

## 2023-08-04 ASSESSMENT — PAIN DESCRIPTION - ONSET
ONSET: SUDDEN
ONSET: SUDDEN

## 2023-08-04 ASSESSMENT — PAIN DESCRIPTION - DESCRIPTORS
DESCRIPTORS: THROBBING
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
## 2025-08-19 ENCOUNTER — HOSPITAL ENCOUNTER (EMERGENCY)
Age: 30
Discharge: HOME | End: 2025-08-19
Attending: EMERGENCY MEDICINE
Payer: COMMERCIAL

## 2025-08-19 VITALS
SYSTOLIC BLOOD PRESSURE: 136 MMHG | TEMPERATURE: 98.6 F | HEART RATE: 100 BPM | WEIGHT: 186.2 LBS | DIASTOLIC BLOOD PRESSURE: 79 MMHG | HEIGHT: 67 IN | OXYGEN SATURATION: 99 % | BODY MASS INDEX: 29.22 KG/M2 | RESPIRATION RATE: 16 BRPM

## 2025-08-19 DIAGNOSIS — J06.9 VIRAL URI: Primary | ICD-10-CM

## 2025-08-19 LAB
FLUAV RNA UPPER RESP QL NAA+PROBE: NEGATIVE
FLUBV AG NPH QL: NEGATIVE
SARS-COV-2 RDRP RESP QL NAA+PROBE: NOT DETECTED

## 2025-08-19 PROCEDURE — 87804 INFLUENZA ASSAY W/OPTIC: CPT

## 2025-08-19 PROCEDURE — 87635 SARS-COV-2 COVID-19 AMP PRB: CPT

## 2025-08-19 PROCEDURE — 87651 STREP A DNA AMP PROBE: CPT

## 2025-08-19 PROCEDURE — 99283 EMERGENCY DEPT VISIT LOW MDM: CPT

## 2025-08-19 RX ORDER — IBUPROFEN 600 MG/1
600 TABLET, FILM COATED ORAL EVERY 6 HOURS PRN
Qty: 40 TABLET | Refills: 0 | Status: SHIPPED | OUTPATIENT
Start: 2025-08-19

## 2025-08-19 ASSESSMENT — PAIN SCALES - GENERAL: PAINLEVEL_OUTOF10: 10

## 2025-08-19 ASSESSMENT — PAIN DESCRIPTION - PAIN TYPE: TYPE: ACUTE PAIN

## 2025-08-19 ASSESSMENT — PAIN - FUNCTIONAL ASSESSMENT: PAIN_FUNCTIONAL_ASSESSMENT: 0-10

## 2025-08-19 ASSESSMENT — PAIN DESCRIPTION - LOCATION: LOCATION: GENERALIZED

## 2025-08-20 LAB
S PYO AG THROAT QL: NEGATIVE
STREP GRP A PCR: NEGATIVE

## (undated) DEVICE — CHLORAPREP 26ML ORANGE

## (undated) DEVICE — Device: Brand: JELCO

## (undated) DEVICE — MEDIA CONTRAST RX ISOVUE-300 61% 30ML VIALS

## (undated) DEVICE — NEEDLE SPNL 22GA L3.5IN BLK HUB S STL REG WALL FIT STYL W/

## (undated) DEVICE — STERILE POLYISOPRENE POWDER-FREE SURGICAL GLOVES: Brand: PROTEXIS

## (undated) DEVICE — PORT VLV 2 W NDL FREE SMRTSITE

## (undated) DEVICE — SYRINGE MED 10ML LUERLOCK TIP W/O SFTY DISP

## (undated) DEVICE — NEEDLE EPI L3.5IN OD20GA CLR POLYCARB HUB WNG N DEHP TUOHY

## (undated) DEVICE — STANDARD HYPODERMIC NEEDLE,POLYPROPYLENE HUB: Brand: MONOJECT

## (undated) DEVICE — GLOVE ORANGE PI 8   MSG9080